# Patient Record
Sex: MALE | Race: BLACK OR AFRICAN AMERICAN | NOT HISPANIC OR LATINO | Employment: FULL TIME | URBAN - NONMETROPOLITAN AREA
[De-identification: names, ages, dates, MRNs, and addresses within clinical notes are randomized per-mention and may not be internally consistent; named-entity substitution may affect disease eponyms.]

---

## 2017-02-28 ENCOUNTER — HOSPITAL ENCOUNTER (EMERGENCY)
Facility: HOSPITAL | Age: 42
Discharge: HOME | End: 2017-02-28
Attending: EMERGENCY MEDICINE | Admitting: EMERGENCY MEDICINE

## 2017-02-28 VITALS
TEMPERATURE: 98.1 F | RESPIRATION RATE: 16 BRPM | DIASTOLIC BLOOD PRESSURE: 102 MMHG | BODY MASS INDEX: 35.07 KG/M2 | OXYGEN SATURATION: 97 % | SYSTOLIC BLOOD PRESSURE: 167 MMHG | WEIGHT: 245 LBS | HEIGHT: 70 IN | HEART RATE: 67 BPM

## 2017-02-28 DIAGNOSIS — Z20.2 POTENTIAL EXPOSURE TO STD: Primary | ICD-10-CM

## 2017-02-28 DIAGNOSIS — I10 ESSENTIAL HYPERTENSION: ICD-10-CM

## 2017-02-28 LAB
BACTERIA UR QL AUTO: ABNORMAL /HPF
BILIRUB UR QL STRIP: ABNORMAL
CLARITY UR: CLEAR
COLOR UR: ABNORMAL
GLUCOSE UR STRIP-MCNC: NEGATIVE MG/DL
HAV IGM SERPL QL IA: NEGATIVE
HBV CORE IGM SERPL QL IA: NEGATIVE
HBV SURFACE AG SERPL QL IA: NEGATIVE
HCV AB SER DONR QL: NEGATIVE
HGB UR QL STRIP.AUTO: NEGATIVE
HIV1+2 AB SER QL: NEGATIVE
HOLD SPECIMEN: NORMAL
HYALINE CASTS UR QL AUTO: ABNORMAL /LPF
KETONES UR QL STRIP: NEGATIVE
LEUKOCYTE ESTERASE UR QL STRIP.AUTO: NEGATIVE
NITRITE UR QL STRIP: NEGATIVE
PH UR STRIP.AUTO: 6.5 [PH] (ref 5–9)
PROT UR QL STRIP: ABNORMAL
RBC # UR: ABNORMAL /HPF
REF LAB TEST METHOD: ABNORMAL
SP GR UR STRIP: 1.04 (ref 1–1.03)
SQUAMOUS #/AREA URNS HPF: ABNORMAL /HPF
UROBILINOGEN UR QL STRIP: ABNORMAL
WBC UR QL AUTO: ABNORMAL /HPF
WHOLE BLOOD HOLD SPECIMEN: NORMAL
WHOLE BLOOD HOLD SPECIMEN: NORMAL

## 2017-02-28 PROCEDURE — 36415 COLL VENOUS BLD VENIPUNCTURE: CPT

## 2017-02-28 PROCEDURE — 80074 ACUTE HEPATITIS PANEL: CPT | Performed by: EMERGENCY MEDICINE

## 2017-02-28 PROCEDURE — G0432 EIA HIV-1/HIV-2 SCREEN: HCPCS | Performed by: EMERGENCY MEDICINE

## 2017-02-28 PROCEDURE — 96372 THER/PROPH/DIAG INJ SC/IM: CPT

## 2017-02-28 PROCEDURE — 86592 SYPHILIS TEST NON-TREP QUAL: CPT | Performed by: EMERGENCY MEDICINE

## 2017-02-28 PROCEDURE — 87800 DETECT AGNT MULT DNA DIREC: CPT | Performed by: EMERGENCY MEDICINE

## 2017-02-28 PROCEDURE — 25010000002 CEFTRIAXONE PER 250 MG: Performed by: EMERGENCY MEDICINE

## 2017-02-28 PROCEDURE — 99283 EMERGENCY DEPT VISIT LOW MDM: CPT

## 2017-02-28 PROCEDURE — 81001 URINALYSIS AUTO W/SCOPE: CPT | Performed by: EMERGENCY MEDICINE

## 2017-02-28 RX ORDER — CEFTRIAXONE SODIUM 250 MG/1
250 INJECTION, POWDER, FOR SOLUTION INTRAMUSCULAR; INTRAVENOUS ONCE
Status: COMPLETED | OUTPATIENT
Start: 2017-02-28 | End: 2017-02-28

## 2017-02-28 RX ORDER — LIDOCAINE HYDROCHLORIDE 10 MG/ML
0.9 INJECTION, SOLUTION EPIDURAL; INFILTRATION; INTRACAUDAL; PERINEURAL ONCE
Status: COMPLETED | OUTPATIENT
Start: 2017-02-28 | End: 2017-02-28

## 2017-02-28 RX ORDER — AZITHROMYCIN 250 MG/1
1000 TABLET, FILM COATED ORAL ONCE
Status: COMPLETED | OUTPATIENT
Start: 2017-02-28 | End: 2017-02-28

## 2017-02-28 RX ADMIN — AZITHROMYCIN 1000 MG: 250 TABLET, FILM COATED ORAL at 11:42

## 2017-02-28 RX ADMIN — LIDOCAINE HYDROCHLORIDE 0.9 ML: 10 INJECTION, SOLUTION EPIDURAL; INFILTRATION; INTRACAUDAL; PERINEURAL at 11:42

## 2017-02-28 RX ADMIN — CEFTRIAXONE SODIUM 250 MG: 250 INJECTION, POWDER, FOR SOLUTION INTRAMUSCULAR; INTRAVENOUS at 11:39

## 2017-03-01 LAB
C TRACH RRNA CVX QL NAA+PROBE: NOT DETECTED
N GONORRHOEA RRNA SPEC QL NAA+PROBE: NOT DETECTED

## 2017-03-02 LAB — RPR SER QL: NORMAL

## 2017-05-16 ENCOUNTER — HOSPITAL ENCOUNTER (EMERGENCY)
Facility: HOSPITAL | Age: 42
Discharge: HOME | End: 2017-05-16
Attending: EMERGENCY MEDICINE | Admitting: EMERGENCY MEDICINE

## 2017-05-16 ENCOUNTER — APPOINTMENT (OUTPATIENT)
Dept: GENERAL RADIOLOGY | Facility: HOSPITAL | Age: 42
End: 2017-05-16

## 2017-05-16 VITALS
RESPIRATION RATE: 18 BRPM | SYSTOLIC BLOOD PRESSURE: 117 MMHG | HEART RATE: 64 BPM | OXYGEN SATURATION: 97 % | HEIGHT: 69 IN | BODY MASS INDEX: 38.66 KG/M2 | TEMPERATURE: 98.4 F | DIASTOLIC BLOOD PRESSURE: 60 MMHG | WEIGHT: 261 LBS

## 2017-05-16 DIAGNOSIS — J18.9 PNEUMONIA OF RIGHT MIDDLE LOBE DUE TO INFECTIOUS ORGANISM: Primary | ICD-10-CM

## 2017-05-16 LAB
ALBUMIN SERPL-MCNC: 4 G/DL (ref 3.4–4.8)
ALBUMIN/GLOB SERPL: 1.1 G/DL (ref 1.1–1.8)
ALP SERPL-CCNC: 60 U/L (ref 38–126)
ALT SERPL W P-5'-P-CCNC: 49 U/L (ref 21–72)
ANION GAP SERPL CALCULATED.3IONS-SCNC: 12 MMOL/L (ref 5–15)
AST SERPL-CCNC: 47 U/L (ref 17–59)
BASOPHILS # BLD AUTO: 0.01 10*3/MM3 (ref 0–0.2)
BASOPHILS NFR BLD AUTO: 0.2 % (ref 0–2)
BILIRUB SERPL-MCNC: 0.8 MG/DL (ref 0.2–1.3)
BUN BLD-MCNC: 16 MG/DL (ref 7–21)
BUN/CREAT SERPL: 16 (ref 7–25)
CALCIUM SPEC-SCNC: 9 MG/DL (ref 8.4–10.2)
CHLORIDE SERPL-SCNC: 96 MMOL/L (ref 95–110)
CO2 SERPL-SCNC: 27 MMOL/L (ref 22–31)
CREAT BLD-MCNC: 1 MG/DL (ref 0.7–1.3)
DEPRECATED RDW RBC AUTO: 40 FL (ref 35.1–43.9)
EOSINOPHIL # BLD AUTO: 0.1 10*3/MM3 (ref 0–0.7)
EOSINOPHIL NFR BLD AUTO: 1.6 % (ref 0–7)
ERYTHROCYTE [DISTWIDTH] IN BLOOD BY AUTOMATED COUNT: 12 % (ref 11.5–14.5)
GFR SERPL CREATININE-BSD FRML MDRD: 99 ML/MIN/1.73 (ref 63–147)
GLOBULIN UR ELPH-MCNC: 3.8 GM/DL (ref 2.3–3.5)
GLUCOSE BLD-MCNC: 122 MG/DL (ref 60–100)
HCT VFR BLD AUTO: 33.3 % (ref 39–49)
HGB BLD-MCNC: 11.6 G/DL (ref 13.7–17.3)
HOLD SPECIMEN: NORMAL
IMM GRANULOCYTES # BLD: 0.03 10*3/MM3 (ref 0–0.02)
IMM GRANULOCYTES NFR BLD: 0.5 % (ref 0–0.5)
INR PPP: 1.03 (ref 0.8–1.2)
LIPASE SERPL-CCNC: 242 U/L (ref 23–300)
LYMPHOCYTES # BLD AUTO: 1.71 10*3/MM3 (ref 0.6–4.2)
LYMPHOCYTES NFR BLD AUTO: 27.5 % (ref 10–50)
MCH RBC QN AUTO: 31.7 PG (ref 26.5–34)
MCHC RBC AUTO-ENTMCNC: 34.8 G/DL (ref 31.5–36.3)
MCV RBC AUTO: 91 FL (ref 80–98)
MONOCYTES # BLD AUTO: 0.65 10*3/MM3 (ref 0–0.9)
MONOCYTES NFR BLD AUTO: 10.5 % (ref 0–12)
NEUTROPHILS # BLD AUTO: 3.71 10*3/MM3 (ref 2–8.6)
NEUTROPHILS NFR BLD AUTO: 59.7 % (ref 37–80)
NT-PROBNP SERPL-MCNC: 40.9 PG/ML (ref 0–450)
PLATELET # BLD AUTO: 276 10*3/MM3 (ref 150–450)
PMV BLD AUTO: 11.1 FL (ref 8–12)
POTASSIUM BLD-SCNC: 3.2 MMOL/L (ref 3.5–5.1)
PROT SERPL-MCNC: 7.8 G/DL (ref 6.3–8.6)
PROTHROMBIN TIME: 13.4 SECONDS (ref 11.1–15.3)
RBC # BLD AUTO: 3.66 10*6/MM3 (ref 4.37–5.74)
SODIUM BLD-SCNC: 135 MMOL/L (ref 137–145)
TROPONIN I SERPL-MCNC: <0.012 NG/ML
WBC NRBC COR # BLD: 6.21 10*3/MM3 (ref 3.2–9.8)
WHOLE BLOOD HOLD SPECIMEN: NORMAL
WHOLE BLOOD HOLD SPECIMEN: NORMAL

## 2017-05-16 PROCEDURE — 85610 PROTHROMBIN TIME: CPT | Performed by: EMERGENCY MEDICINE

## 2017-05-16 PROCEDURE — 83690 ASSAY OF LIPASE: CPT | Performed by: EMERGENCY MEDICINE

## 2017-05-16 PROCEDURE — 80053 COMPREHEN METABOLIC PANEL: CPT | Performed by: EMERGENCY MEDICINE

## 2017-05-16 PROCEDURE — 85025 COMPLETE CBC W/AUTO DIFF WBC: CPT | Performed by: EMERGENCY MEDICINE

## 2017-05-16 PROCEDURE — 71010 HC CHEST PA OR AP: CPT

## 2017-05-16 PROCEDURE — 84484 ASSAY OF TROPONIN QUANT: CPT | Performed by: EMERGENCY MEDICINE

## 2017-05-16 PROCEDURE — 25010000002 CEFTRIAXONE: Performed by: EMERGENCY MEDICINE

## 2017-05-16 PROCEDURE — 99283 EMERGENCY DEPT VISIT LOW MDM: CPT

## 2017-05-16 PROCEDURE — 83880 ASSAY OF NATRIURETIC PEPTIDE: CPT | Performed by: EMERGENCY MEDICINE

## 2017-05-16 PROCEDURE — 93005 ELECTROCARDIOGRAM TRACING: CPT | Performed by: EMERGENCY MEDICINE

## 2017-05-16 PROCEDURE — 93010 ELECTROCARDIOGRAM REPORT: CPT | Performed by: INTERNAL MEDICINE

## 2017-05-16 PROCEDURE — 96374 THER/PROPH/DIAG INJ IV PUSH: CPT

## 2017-05-16 RX ORDER — POTASSIUM CHLORIDE 750 MG/1
40 CAPSULE, EXTENDED RELEASE ORAL ONCE
Status: COMPLETED | OUTPATIENT
Start: 2017-05-16 | End: 2017-05-16

## 2017-05-16 RX ORDER — LEVOFLOXACIN 750 MG/1
750 TABLET ORAL DAILY
COMMUNITY

## 2017-05-16 RX ORDER — SODIUM CHLORIDE 0.9 % (FLUSH) 0.9 %
10 SYRINGE (ML) INJECTION AS NEEDED
Status: DISCONTINUED | OUTPATIENT
Start: 2017-05-16 | End: 2017-05-17 | Stop reason: HOSPADM

## 2017-05-16 RX ADMIN — POTASSIUM CHLORIDE 40 MEQ: 750 CAPSULE, EXTENDED RELEASE ORAL at 23:03

## 2017-05-16 RX ADMIN — CEFTRIAXONE 1 G: 1 INJECTION, POWDER, FOR SOLUTION INTRAMUSCULAR; INTRAVENOUS at 22:56

## 2017-05-16 RX ADMIN — NITROGLYCERIN 1 INCH: 20 OINTMENT TOPICAL at 21:22

## 2017-05-16 RX ADMIN — Medication 10 ML: at 22:56

## 2017-05-17 LAB — HOLD SPECIMEN: NORMAL

## 2024-06-29 ENCOUNTER — HOSPITAL ENCOUNTER (EMERGENCY)
Facility: HOSPITAL | Age: 49
Discharge: HOME | End: 2024-06-30
Attending: EMERGENCY MEDICINE | Admitting: EMERGENCY MEDICINE
Payer: MEDICAID

## 2024-06-29 ENCOUNTER — APPOINTMENT (OUTPATIENT)
Dept: GENERAL RADIOLOGY | Facility: HOSPITAL | Age: 49
End: 2024-06-29
Payer: MEDICAID

## 2024-06-29 DIAGNOSIS — R07.9 NONSPECIFIC CHEST PAIN: ICD-10-CM

## 2024-06-29 DIAGNOSIS — R07.89 CHEST WALL PAIN: Primary | ICD-10-CM

## 2024-06-29 LAB
ALBUMIN SERPL-MCNC: 4.5 G/DL (ref 3.5–5.2)
ALBUMIN/GLOB SERPL: 2 G/DL
ALP SERPL-CCNC: 97 U/L (ref 39–117)
ALT SERPL W P-5'-P-CCNC: 27 U/L (ref 1–41)
ANION GAP SERPL CALCULATED.3IONS-SCNC: 12 MMOL/L (ref 5–15)
AST SERPL-CCNC: 19 U/L (ref 1–40)
BASOPHILS # BLD AUTO: 0.02 10*3/MM3 (ref 0–0.2)
BASOPHILS NFR BLD AUTO: 0.3 % (ref 0–1.5)
BILIRUB SERPL-MCNC: 0.7 MG/DL (ref 0–1.2)
BUN SERPL-MCNC: 10 MG/DL (ref 6–20)
BUN/CREAT SERPL: 11.5 (ref 7–25)
CALCIUM SPEC-SCNC: 9.2 MG/DL (ref 8.6–10.5)
CHLORIDE SERPL-SCNC: 102 MMOL/L (ref 98–107)
CO2 SERPL-SCNC: 25 MMOL/L (ref 22–29)
CREAT SERPL-MCNC: 0.87 MG/DL (ref 0.76–1.27)
DEPRECATED RDW RBC AUTO: 39.7 FL (ref 37–54)
EGFRCR SERPLBLD CKD-EPI 2021: 105.1 ML/MIN/1.73
EOSINOPHIL # BLD AUTO: 0.11 10*3/MM3 (ref 0–0.4)
EOSINOPHIL NFR BLD AUTO: 1.6 % (ref 0.3–6.2)
ERYTHROCYTE [DISTWIDTH] IN BLOOD BY AUTOMATED COUNT: 11.7 % (ref 12.3–15.4)
GLOBULIN UR ELPH-MCNC: 2.3 GM/DL
GLUCOSE SERPL-MCNC: 219 MG/DL (ref 65–99)
HCT VFR BLD AUTO: 39.6 % (ref 37.5–51)
HGB BLD-MCNC: 13.1 G/DL (ref 13–17.7)
HOLD SPECIMEN: NORMAL
IMM GRANULOCYTES # BLD AUTO: 0.05 10*3/MM3 (ref 0–0.05)
IMM GRANULOCYTES NFR BLD AUTO: 0.7 % (ref 0–0.5)
LIPASE SERPL-CCNC: 49 U/L (ref 13–60)
LYMPHOCYTES # BLD AUTO: 2.33 10*3/MM3 (ref 0.7–3.1)
LYMPHOCYTES NFR BLD AUTO: 33.6 % (ref 19.6–45.3)
MCH RBC QN AUTO: 30.7 PG (ref 26.6–33)
MCHC RBC AUTO-ENTMCNC: 33.1 G/DL (ref 31.5–35.7)
MCV RBC AUTO: 92.7 FL (ref 79–97)
MONOCYTES # BLD AUTO: 0.48 10*3/MM3 (ref 0.1–0.9)
MONOCYTES NFR BLD AUTO: 6.9 % (ref 5–12)
NEUTROPHILS NFR BLD AUTO: 3.94 10*3/MM3 (ref 1.7–7)
NEUTROPHILS NFR BLD AUTO: 56.9 % (ref 42.7–76)
NRBC BLD AUTO-RTO: 0 /100 WBC (ref 0–0.2)
NT-PROBNP SERPL-MCNC: 61 PG/ML (ref 0–900)
PLATELET # BLD AUTO: 289 10*3/MM3 (ref 140–450)
PMV BLD AUTO: 10.4 FL (ref 6–12)
POTASSIUM SERPL-SCNC: 3.7 MMOL/L (ref 3.5–5.2)
PROT SERPL-MCNC: 6.8 G/DL (ref 6–8.5)
RBC # BLD AUTO: 4.27 10*6/MM3 (ref 4.14–5.8)
SODIUM SERPL-SCNC: 139 MMOL/L (ref 136–145)
TROPONIN T SERPL HS-MCNC: 20 NG/L
WBC NRBC COR # BLD AUTO: 6.93 10*3/MM3 (ref 3.4–10.8)
WHOLE BLOOD HOLD COAG: NORMAL
WHOLE BLOOD HOLD SPECIMEN: NORMAL

## 2024-06-29 PROCEDURE — 25010000002 ONDANSETRON PER 1 MG: Performed by: EMERGENCY MEDICINE

## 2024-06-29 PROCEDURE — 83690 ASSAY OF LIPASE: CPT

## 2024-06-29 PROCEDURE — 84484 ASSAY OF TROPONIN QUANT: CPT

## 2024-06-29 PROCEDURE — 80053 COMPREHEN METABOLIC PANEL: CPT

## 2024-06-29 PROCEDURE — 25010000002 MORPHINE PER 10 MG: Performed by: EMERGENCY MEDICINE

## 2024-06-29 PROCEDURE — 71045 X-RAY EXAM CHEST 1 VIEW: CPT

## 2024-06-29 PROCEDURE — 36415 COLL VENOUS BLD VENIPUNCTURE: CPT

## 2024-06-29 PROCEDURE — 96374 THER/PROPH/DIAG INJ IV PUSH: CPT

## 2024-06-29 PROCEDURE — 83880 ASSAY OF NATRIURETIC PEPTIDE: CPT

## 2024-06-29 PROCEDURE — 99284 EMERGENCY DEPT VISIT MOD MDM: CPT

## 2024-06-29 PROCEDURE — 96375 TX/PRO/DX INJ NEW DRUG ADDON: CPT

## 2024-06-29 PROCEDURE — 93005 ELECTROCARDIOGRAM TRACING: CPT

## 2024-06-29 PROCEDURE — 85025 COMPLETE CBC W/AUTO DIFF WBC: CPT

## 2024-06-29 RX ORDER — MORPHINE SULFATE 4 MG/ML
4 INJECTION, SOLUTION INTRAMUSCULAR; INTRAVENOUS ONCE
Status: COMPLETED | OUTPATIENT
Start: 2024-06-29 | End: 2024-06-29

## 2024-06-29 RX ORDER — ASPIRIN 81 MG/1
324 TABLET, CHEWABLE ORAL ONCE
Status: COMPLETED | OUTPATIENT
Start: 2024-06-29 | End: 2024-06-29

## 2024-06-29 RX ORDER — SODIUM CHLORIDE 0.9 % (FLUSH) 0.9 %
10 SYRINGE (ML) INJECTION AS NEEDED
Status: DISCONTINUED | OUTPATIENT
Start: 2024-06-29 | End: 2024-06-30 | Stop reason: HOSPADM

## 2024-06-29 RX ORDER — ONDANSETRON 2 MG/ML
4 INJECTION INTRAMUSCULAR; INTRAVENOUS ONCE
Status: COMPLETED | OUTPATIENT
Start: 2024-06-29 | End: 2024-06-29

## 2024-06-29 RX ADMIN — ASPIRIN 243 MG: 81 TABLET, CHEWABLE ORAL at 21:59

## 2024-06-29 RX ADMIN — MORPHINE SULFATE 4 MG: 4 INJECTION, SOLUTION INTRAMUSCULAR; INTRAVENOUS at 22:10

## 2024-06-29 RX ADMIN — ONDANSETRON 4 MG: 2 INJECTION INTRAMUSCULAR; INTRAVENOUS at 22:09

## 2024-06-30 VITALS
SYSTOLIC BLOOD PRESSURE: 137 MMHG | DIASTOLIC BLOOD PRESSURE: 80 MMHG | RESPIRATION RATE: 16 BRPM | BODY MASS INDEX: 35.25 KG/M2 | OXYGEN SATURATION: 91 % | WEIGHT: 238 LBS | HEIGHT: 69 IN | TEMPERATURE: 98.4 F | HEART RATE: 76 BPM

## 2024-06-30 LAB
GEN 5 2HR TROPONIN T REFLEX: 21 NG/L
TROPONIN T DELTA: 1 NG/L

## 2024-06-30 PROCEDURE — 84484 ASSAY OF TROPONIN QUANT: CPT

## 2024-06-30 RX ORDER — METHOCARBAMOL 750 MG/1
750 TABLET, FILM COATED ORAL 3 TIMES DAILY
Qty: 21 TABLET | Refills: 0 | Status: SHIPPED | OUTPATIENT
Start: 2024-06-30 | End: 2024-07-07

## 2024-06-30 NOTE — ED PROVIDER NOTES
Subjective   History of Present Illness  This is a 49-year-old male with past medical history of diabetes hypertension presented the emergency department some chest discomfort.  The patient is had the symptoms for last 48 hours.  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  Patient said the pain is persistent since then.  It is now radiating up into his jaw and down his arm.  Patient is concerned because he has a history of a coronary event in the past.  Patient did not take anything for the pain.  He denies any fevers or chills.  No headache or change in vision.  No focal weakness.  No shortness of breath.  No abdominal pain or vomiting.    History provided by:  Patient   used: No        Review of Systems   Constitutional:  Negative for chills and fever.   HENT:  Negative for congestion, ear pain and sore throat.    Eyes:  Negative for visual disturbance.   Respiratory:  Negative for shortness of breath.    Cardiovascular:  Positive for chest pain.   Gastrointestinal:  Negative for abdominal pain.   Genitourinary:  Negative for difficulty urinating.   Musculoskeletal:  Negative for arthralgias.   Skin:  Negative for rash.   Neurological:  Negative for dizziness, weakness and numbness.   Psychiatric/Behavioral:  Negative for agitation.        No past medical history on file.    No Known Allergies    Past Surgical History:   Procedure Laterality Date    KNEE ARTHROSCOPY         No family history on file.    Social History     Socioeconomic History    Marital status: Single   Tobacco Use    Smoking status: Some Days     Current packs/day: 0.50     Types: Cigarettes   Substance and Sexual Activity    Alcohol use: Yes     Alcohol/week: 2.0 standard drinks of alcohol     Types: 2 Shots of liquor per week    Drug use: No           Objective   Physical Exam  Vitals and nursing note reviewed.   Constitutional:       General: He is not in acute distress.      Appearance: He is not ill-appearing or toxic-appearing.   HENT:      Mouth/Throat:      Pharynx: No posterior oropharyngeal erythema.   Eyes:      Extraocular Movements: Extraocular movements intact.      Pupils: Pupils are equal, round, and reactive to light.   Cardiovascular:      Rate and Rhythm: Normal rate and regular rhythm.   Pulmonary:      Effort: Pulmonary effort is normal. No respiratory distress.   Chest:      Chest wall: Tenderness present.   Abdominal:      General: Abdomen is flat. There is no distension.      Palpations: There is no mass.      Tenderness: There is no abdominal tenderness. There is no guarding or rebound.   Musculoskeletal:         General: No deformity. Normal range of motion.   Neurological:      General: No focal deficit present.      Mental Status: He is alert.      Sensory: No sensory deficit.      Motor: No weakness.         Procedures           ED Course  ED Course as of 06/30/24 0210   Sat Jun 29, 2024 2129 BP: 152/93 [JK]   2129 Temp: 98.4 °F (36.9 °C) [JK]   2129 Temp src: Oral [JK]   2129 Heart Rate: 71 [JK]   2129 Resp: 16 [JK]   2129 SpO2: 97 %  Interpretation:  Patient's repeat vitals, telemetry tracing, and pulse oximetry tracing were directly viewed and interpreted by myself.  Normal sinus rhythm [JK]   Melissa Jun 30, 2024   0208 Comprehensive Metabolic Panel(!) [JK]   0208 CBC & Differential(!) [JK]   0208 BNP [JK]   0208 Lipase [JK]   0208 High Sensitivity Troponin T [JK]   0208 High Sensitivity Troponin T 2Hr  Interpretation:  Laboratory studies were reviewed and interpreted directly by myself.  CMP was unremarkable, CBC normal, BMP normal, lipase normal, initial troponin was 20, repeat was 21 with a delta gap of 1 [JK]   0208 XR Chest 1 View  Interpretation:  Imaging was directly visualized by myself, per my interpretations, chest x-ray was unremarkable. [JK]   0209 On reevaluation, the patient is feeling much better.  Vital signs have improved.  Overall they are  well-appearing.  There were no abnormal cardiopulmonary findings.  No respiratory distress.  Abdomen soft, nontender and no evidence of acute abdomen.  Story is minimally concerning for ACS, PE or dissection given the atypical nature, risk factors, history and physical examination.  Patient's heart score places the patient at low risk for acute coronary syndrome.  Patient feels comfortable following up with her primary care physician and/or primary cardiologist.  Patient was given strict return precautions.  Verbalized understanding. [JK]   0262 I had a discussion with the patient/family regarding diagnosis, diagnostic results, treatment plan, and medications. The patient/family indicated understanding of these instructions. I spent adequate time at the bedside prior to discharge necessary to discuss the aftercare instructions, giving patient education, providing explanations of the results of our evaluations/findings, and my decision making to assure that the patient/family understand the plan of care. Time was allotted to answer questions at that time and throughout the ED course. Patient is required to maintain timely follow up, as discussed. I also discussed the potential for the development of an acute emergent condition requiring further evaluation, return to the ER, admission, or even surgical intervention.  I encouraged the patient to return to the emergency department immediately for any concerns, worsening symptoms, new complaints, or if symptoms persist and they are unable to seek follow-up in a timely fashion. The patient/family expressed understanding and agreement with this plan    Shared decision making:   After full review of the patient's clinical presentation, review of any work-up including but not limited to laboratory studies and radiology obtained, I had a discussion with the patient.  Treatment options were discussed as well as the risks, benefits and consequences.  I discussed all findings  with the patient and family members if available.  During the discussion, treatment goals were understood by all as well as any misconceptions which were addressed with the patient.  Ample time was given for any questions they may have had.  They are in agreement with the treatment plan as well as final disposition. [JK]      ED Course User Index  [JK] Miguel Keyes MD                HEART Score: 3                              Medical Decision Making  This is a 49-year-old male with a history of diabetes, hypertension and CAD presented to the emergency department with some chest discomfort.  Seems to be atypical in nature.  Is been going on for the last 48 hours.  Patient is reproducible tenderness exam.  And concern for likely musculoskeletal pain.  However the patient does have some risk factors and history of CAD which will require workup.  Overall, the patient is nontoxic.  Afebrile.  IV access was established and the patient.  Placed on continuous telemetry monitoring.  Given the patient's presentation, differential is broad and will require further evaluation.  Workup initiated.      Differential diagnosis: CAD, ACS, peptic ulcer disease, dyspepsia, pneumonia, bronchitis, atypical chest pain, angina, musculoskeletal pain      Amount and/or Complexity of Data Reviewed  External Data Reviewed: labs, radiology, ECG and notes.     Details: External laboratories, imaging as well as notes were reviewed personally by myself.  All relevant studies were used to guide decision making.     Date of previous record: 12/27/2022    Source of note: Admission record    Summary: Patient was admitted for NSTEMI.  Reviewed based laboratory studies on file as well as previous chest x-ray and EKG.  Records reviewed    Labs: ordered. Decision-making details documented in ED Course.  Radiology: ordered and independent interpretation performed. Decision-making details documented in ED Course.  ECG/medicine tests: ordered and  independent interpretation performed. Decision-making details documented in ED Course.    Risk  Prescription drug management.        Final diagnoses:   Chest wall pain   Nonspecific chest pain       ED Disposition  ED Disposition       ED Disposition   Discharge    Condition   Stable    Comment   --               [facility]  [address]  [address]  [facility] [id number] [phone]  Call in 1 day           Medication List        New Prescriptions      methocarbamol 750 MG tablet  Commonly known as: ROBAXIN  Take 1 tablet by mouth 3 (Three) Times a Day for 7 days.               Where to Get Your Medications        These medications were sent to [facility] [id number] - [place], [place] - [facility] [address] AT [facility] [address] - [phone] PH - [phone] [address], [facility] [address]      Phone: [phone]   methocarbamol 750 MG tablet            Miguel Keyes MD  06/30/24 0218

## 2024-07-01 LAB
QT INTERVAL: 372 MS
QTC INTERVAL: 404 MS

## 2024-10-24 PROBLEM — Z95.1 S/P CABG X 5: Status: ACTIVE | Noted: 2023-01-14

## 2024-10-24 PROBLEM — F32.0 MILD MAJOR DEPRESSION: Status: ACTIVE | Noted: 2024-03-06

## 2024-10-24 PROBLEM — I21.4 NSTEMI (NON-ST ELEVATED MYOCARDIAL INFARCTION): Status: ACTIVE | Noted: 2022-10-26

## 2024-10-24 PROBLEM — Z80.0 FAMILY HISTORY OF MALIGNANT NEOPLASM OF COLON IN RELATIVE DIAGNOSED WHEN OLDER THAN 50 YEARS OF AGE: Status: ACTIVE | Noted: 2023-02-08

## 2024-10-24 PROBLEM — F17.210 CIGARETTE SMOKER: Status: ACTIVE | Noted: 2023-05-17

## 2024-10-24 PROBLEM — I25.10 ARTERIOSCLEROSIS OF CORONARY ARTERY: Status: ACTIVE | Noted: 2022-10-27

## 2024-10-24 PROBLEM — F41.9 ANXIETY: Status: ACTIVE | Noted: 2022-10-31

## 2024-10-24 PROBLEM — Z91.148 NONCOMPLIANCE WITH MEDICATION REGIMEN: Status: ACTIVE | Noted: 2022-10-26

## 2024-10-24 PROBLEM — E78.5 DYSLIPIDEMIA, GOAL LDL BELOW 70: Status: ACTIVE | Noted: 2022-11-28

## 2024-10-24 PROBLEM — E11.40 TYPE 2 DIABETES MELLITUS WITH DIABETIC NEUROPATHY, WITHOUT LONG-TERM CURRENT USE OF INSULIN: Status: ACTIVE | Noted: 2023-03-09

## 2024-10-24 PROBLEM — M17.11 PRIMARY OSTEOARTHRITIS OF RIGHT KNEE: Status: ACTIVE | Noted: 2023-05-17

## 2025-01-02 ENCOUNTER — APPOINTMENT (OUTPATIENT)
Dept: GENERAL RADIOLOGY | Facility: HOSPITAL | Age: 50
End: 2025-01-02
Payer: MEDICAID

## 2025-01-02 ENCOUNTER — HOSPITAL ENCOUNTER (EMERGENCY)
Facility: HOSPITAL | Age: 50
Discharge: HOME | End: 2025-01-03
Attending: EMERGENCY MEDICINE
Payer: MEDICAID

## 2025-01-02 VITALS
DIASTOLIC BLOOD PRESSURE: 96 MMHG | OXYGEN SATURATION: 99 % | BODY MASS INDEX: 34.8 KG/M2 | RESPIRATION RATE: 20 BRPM | SYSTOLIC BLOOD PRESSURE: 159 MMHG | HEART RATE: 66 BPM | WEIGHT: 235 LBS | HEIGHT: 69 IN | TEMPERATURE: 98.4 F

## 2025-01-02 DIAGNOSIS — R07.9 CHEST PAIN IN ADULT: ICD-10-CM

## 2025-01-02 DIAGNOSIS — J10.1 INFLUENZA A: Primary | ICD-10-CM

## 2025-01-02 DIAGNOSIS — I25.10 CORONARY ARTERY DISEASE INVOLVING NATIVE HEART, UNSPECIFIED VESSEL OR LESION TYPE, UNSPECIFIED WHETHER ANGINA PRESENT: ICD-10-CM

## 2025-01-02 LAB
ALBUMIN SERPL-MCNC: 3.9 G/DL (ref 3.5–5.2)
ALBUMIN/GLOB SERPL: 1.2 G/DL
ALP SERPL-CCNC: 108 U/L (ref 39–117)
ALT SERPL W P-5'-P-CCNC: 29 U/L (ref 1–41)
ANION GAP SERPL CALCULATED.3IONS-SCNC: 12 MMOL/L (ref 5–15)
AST SERPL-CCNC: 24 U/L (ref 1–40)
BASOPHILS # BLD AUTO: 0.02 10*3/MM3 (ref 0–0.2)
BASOPHILS NFR BLD AUTO: 0.4 % (ref 0–1.5)
BILIRUB SERPL-MCNC: 0.5 MG/DL (ref 0–1.2)
BUN SERPL-MCNC: 11 MG/DL (ref 6–20)
BUN/CREAT SERPL: 11.3 (ref 7–25)
CALCIUM SPEC-SCNC: 8.7 MG/DL (ref 8.6–10.5)
CHLORIDE SERPL-SCNC: 95 MMOL/L (ref 98–107)
CO2 SERPL-SCNC: 26 MMOL/L (ref 22–29)
CREAT SERPL-MCNC: 0.97 MG/DL (ref 0.76–1.27)
DEPRECATED RDW RBC AUTO: 38.9 FL (ref 37–54)
EGFRCR SERPLBLD CKD-EPI 2021: 95.1 ML/MIN/1.73
EOSINOPHIL # BLD AUTO: 0.08 10*3/MM3 (ref 0–0.4)
EOSINOPHIL NFR BLD AUTO: 1.6 % (ref 0.3–6.2)
ERYTHROCYTE [DISTWIDTH] IN BLOOD BY AUTOMATED COUNT: 11.7 % (ref 12.3–15.4)
FLUAV SUBTYP SPEC NAA+PROBE: DETECTED
FLUBV RNA ISLT QL NAA+PROBE: NOT DETECTED
GEN 5 1HR TROPONIN T REFLEX: 26 NG/L
GLOBULIN UR ELPH-MCNC: 3.2 GM/DL
GLUCOSE SERPL-MCNC: 287 MG/DL (ref 65–99)
HCT VFR BLD AUTO: 36.6 % (ref 37.5–51)
HGB BLD-MCNC: 12.2 G/DL (ref 13–17.7)
HOLD SPECIMEN: NORMAL
HOLD SPECIMEN: NORMAL
IMM GRANULOCYTES # BLD AUTO: 0.01 10*3/MM3 (ref 0–0.05)
IMM GRANULOCYTES NFR BLD AUTO: 0.2 % (ref 0–0.5)
LIPASE SERPL-CCNC: 75 U/L (ref 13–60)
LYMPHOCYTES # BLD AUTO: 2.04 10*3/MM3 (ref 0.7–3.1)
LYMPHOCYTES NFR BLD AUTO: 41.7 % (ref 19.6–45.3)
MCH RBC QN AUTO: 30.3 PG (ref 26.6–33)
MCHC RBC AUTO-ENTMCNC: 33.3 G/DL (ref 31.5–35.7)
MCV RBC AUTO: 90.8 FL (ref 79–97)
MONOCYTES # BLD AUTO: 0.35 10*3/MM3 (ref 0.1–0.9)
MONOCYTES NFR BLD AUTO: 7.2 % (ref 5–12)
NEUTROPHILS NFR BLD AUTO: 2.39 10*3/MM3 (ref 1.7–7)
NEUTROPHILS NFR BLD AUTO: 48.9 % (ref 42.7–76)
NRBC BLD AUTO-RTO: 0 /100 WBC (ref 0–0.2)
NT-PROBNP SERPL-MCNC: 78.2 PG/ML (ref 0–900)
PLAT MORPH BLD: NORMAL
PLATELET # BLD AUTO: 255 10*3/MM3 (ref 140–450)
PMV BLD AUTO: 10.5 FL (ref 6–12)
POTASSIUM SERPL-SCNC: 3.7 MMOL/L (ref 3.5–5.2)
PROT SERPL-MCNC: 7.1 G/DL (ref 6–8.5)
QT INTERVAL: 380 MS
QTC INTERVAL: 401 MS
RBC # BLD AUTO: 4.03 10*6/MM3 (ref 4.14–5.8)
RBC MORPH BLD: NORMAL
SARS-COV-2 RNA RESP QL NAA+PROBE: NOT DETECTED
SODIUM SERPL-SCNC: 133 MMOL/L (ref 136–145)
TROPONIN T % DELTA: -21 %
TROPONIN T NUMERIC DELTA: -7 NG/L
TROPONIN T SERPL HS-MCNC: 33 NG/L
WBC MORPH BLD: NORMAL
WBC NRBC COR # BLD AUTO: 4.89 10*3/MM3 (ref 3.4–10.8)
WHOLE BLOOD HOLD COAG: NORMAL
WHOLE BLOOD HOLD SPECIMEN: NORMAL

## 2025-01-02 PROCEDURE — 71045 X-RAY EXAM CHEST 1 VIEW: CPT

## 2025-01-02 PROCEDURE — 93005 ELECTROCARDIOGRAM TRACING: CPT

## 2025-01-02 PROCEDURE — 36415 COLL VENOUS BLD VENIPUNCTURE: CPT

## 2025-01-02 PROCEDURE — 87636 SARSCOV2 & INF A&B AMP PRB: CPT | Performed by: EMERGENCY MEDICINE

## 2025-01-02 PROCEDURE — 84484 ASSAY OF TROPONIN QUANT: CPT | Performed by: EMERGENCY MEDICINE

## 2025-01-02 PROCEDURE — 80053 COMPREHEN METABOLIC PANEL: CPT | Performed by: EMERGENCY MEDICINE

## 2025-01-02 PROCEDURE — 85007 BL SMEAR W/DIFF WBC COUNT: CPT | Performed by: EMERGENCY MEDICINE

## 2025-01-02 PROCEDURE — 85025 COMPLETE CBC W/AUTO DIFF WBC: CPT | Performed by: EMERGENCY MEDICINE

## 2025-01-02 PROCEDURE — 93005 ELECTROCARDIOGRAM TRACING: CPT | Performed by: EMERGENCY MEDICINE

## 2025-01-02 PROCEDURE — 83690 ASSAY OF LIPASE: CPT | Performed by: EMERGENCY MEDICINE

## 2025-01-02 PROCEDURE — 83880 ASSAY OF NATRIURETIC PEPTIDE: CPT | Performed by: EMERGENCY MEDICINE

## 2025-01-02 PROCEDURE — 99284 EMERGENCY DEPT VISIT MOD MDM: CPT

## 2025-01-02 RX ORDER — ASPIRIN 81 MG/1
324 TABLET, CHEWABLE ORAL ONCE
Status: COMPLETED | OUTPATIENT
Start: 2025-01-02 | End: 2025-01-02

## 2025-01-02 RX ORDER — BENZONATATE 100 MG/1
100 CAPSULE ORAL 3 TIMES DAILY PRN
Qty: 15 CAPSULE | Refills: 0 | Status: SHIPPED | OUTPATIENT
Start: 2025-01-02 | End: 2025-01-07

## 2025-01-02 RX ORDER — NITROGLYCERIN 0.4 MG/1
0.4 TABLET SUBLINGUAL
Status: DISCONTINUED | OUTPATIENT
Start: 2025-01-02 | End: 2025-01-03 | Stop reason: HOSPADM

## 2025-01-02 RX ORDER — BENZONATATE 100 MG/1
100 CAPSULE ORAL ONCE
Status: COMPLETED | OUTPATIENT
Start: 2025-01-02 | End: 2025-01-02

## 2025-01-02 RX ORDER — SODIUM CHLORIDE 0.9 % (FLUSH) 0.9 %
10 SYRINGE (ML) INJECTION AS NEEDED
Status: DISCONTINUED | OUTPATIENT
Start: 2025-01-02 | End: 2025-01-03 | Stop reason: HOSPADM

## 2025-01-02 RX ORDER — OSELTAMIVIR PHOSPHATE 75 MG/1
75 CAPSULE ORAL EVERY 12 HOURS SCHEDULED
Status: DISCONTINUED | OUTPATIENT
Start: 2025-01-02 | End: 2025-01-03 | Stop reason: HOSPADM

## 2025-01-02 RX ORDER — OSELTAMIVIR PHOSPHATE 75 MG/1
75 CAPSULE ORAL 2 TIMES DAILY
Qty: 10 CAPSULE | Refills: 0 | Status: SHIPPED | OUTPATIENT
Start: 2025-01-02 | End: 2025-01-07

## 2025-01-02 RX ADMIN — ASPIRIN 81 MG CHEWABLE TABLET 324 MG: 81 TABLET CHEWABLE at 22:50

## 2025-01-02 RX ADMIN — OSELTAMIVIR PHOSPHATE 75 MG: 75 CAPSULE ORAL at 23:26

## 2025-01-02 RX ADMIN — BENZONATATE 100 MG: 100 CAPSULE ORAL at 23:26

## 2025-01-03 LAB
QT INTERVAL: 446 MS
QTC INTERVAL: 481 MS

## 2025-01-03 NOTE — ED PROVIDER NOTES
[facility]    EMERGENCY DEPARTMENT ENCOUNTER      Pt Name: Aureliano Blake  MRN: [id number]  YOB: 1975  Date of evaluation: 1/2/2025  Provider: Sudhir An MD    CHIEF COMPLAINT       Chief Complaint   Patient presents with    Chest Pain         HISTORY OF PRESENT ILLNESS   Aureliano Blake is a 49 y.o. male who presents to the emergency department for evaluation of chest pain.  Patient states he is from a mild upper respiratory illness for about 3 or 4 days now.  Was not too worried about it until he developed left-sided chest pain starting 2 days ago that has been waxing and waning.  He does currently have pain and it has been present since midday today.  He has a little bit of difficulty breathing and a cough.  No fevers but has had episodes of diaphoresis.    Has a significant coronary disease history with coronary artery bypass    REVIEW OF SYSTEMS     ROS:  A chief complaint appropriate review of systems was completed and is negative except as noted in the HPI.      PAST MEDICAL HISTORY     Past Medical History:   Diagnosis Date    Diabetes mellitus     Heart disease     Hypertension          SURGICAL HISTORY       Past Surgical History:   Procedure Laterality Date    CARDIAC SURGERY      KNEE ARTHROSCOPY     CABG      CURRENT MEDICATIONS     No current facility-administered medications for this encounter.    Current Outpatient Medications:     amLODIPine (NORVASC) 10 MG tablet, Take 1 tablet by mouth Daily., Disp: , Rfl:     aspirin 81 MG EC tablet, Take 1 tablet by mouth Daily., Disp: , Rfl:     atorvastatin (LIPITOR) 80 MG tablet, Take 1 tablet by mouth Daily., Disp: , Rfl:     benzonatate (TESSALON) 100 MG capsule, Take 1 capsule by mouth 3 (Three) Times a Day As Needed for Cough for up to 5 days., Disp: 15 capsule, Rfl: 0    buPROPion SR (WELLBUTRIN SR) 150 MG 12 hr tablet, Take 1 tablet by mouth., Disp: , Rfl:     clopidogrel (PLAVIX) 75 MG tablet, Take 1 tablet by mouth Daily., Disp: ,  Rfl:     Diclofenac Sodium (VOLTAREN) 1 % gel gel, Apply 4 g topically to the appropriate area as directed 4 (Four) Times a Day As Needed (muscle and joint pain)., Disp: 100 g, Rfl: 0    empagliflozin (JARDIANCE) 25 MG tablet tablet, Take 1 tablet by mouth Daily., Disp: , Rfl:     ezetimibe (ZETIA) 10 MG tablet, , Disp: , Rfl:     glimepiride (AMARYL) 4 MG tablet, Take 1 tablet by mouth Daily., Disp: , Rfl:     losartan (COZAAR) 100 MG tablet, Take 1 tablet by mouth Daily., Disp: , Rfl:     metFORMIN ER (GLUCOPHAGE-XR) 500 MG 24 hr tablet, Take 2 tablets by mouth., Disp: , Rfl:     methocarbamol (ROBAXIN) 750 MG tablet, Take 1 tablet by mouth 3 (Three) Times a Day As Needed for Muscle Spasms., Disp: 30 tablet, Rfl: 0    metoprolol succinate XL (TOPROL-XL) 50 MG 24 hr tablet, Take 1 tablet by mouth Daily., Disp: , Rfl:     oseltamivir (TAMIFLU) 75 MG capsule, Take 1 capsule by mouth 2 (Two) Times a Day for 5 days., Disp: 10 capsule, Rfl: 0    Ozempic, 1 MG/DOSE, 4 MG/3ML solution pen-injector, Inject 1 mg under the skin into the appropriate area as directed., Disp: , Rfl:     ALLERGIES     Patient has no known allergies.    FAMILY HISTORY     History reviewed. No pertinent family history.       SOCIAL HISTORY       Social History     Socioeconomic History    Marital status: Single   Tobacco Use    Smoking status: Some Days     Current packs/day: 0.50     Types: Cigarettes    Smokeless tobacco: Never   Vaping Use    Vaping status: Never Used   Substance and Sexual Activity    Alcohol use: Yes     Alcohol/week: 2.0 standard drinks of alcohol     Types: 2 Shots of liquor per week    Drug use: No    Sexual activity: Defer         PHYSICAL EXAM    (up to 7 for level 4, 8 or more for level 5)     Vitals:    01/02/25 2323 01/02/25 2333 01/02/25 2338 01/02/25 2343   BP:       Pulse: 69 71 64 66   Resp:       Temp:       SpO2: 97% 97% 97% 99%   Weight:       Height:           Physical Exam  Constitutional:       General: He  is not in acute distress.  HENT:      Head: Normocephalic and atraumatic.   Eyes:      Conjunctiva/sclera: Conjunctivae normal.      Pupils: Pupils are equal, round, and reactive to light.   Cardiovascular:      Rate and Rhythm: Normal rate and regular rhythm.      Pulses: Normal pulses.      Heart sounds: No murmur heard.     No gallop.   Pulmonary:      Effort: Pulmonary effort is normal. No respiratory distress.   Abdominal:      General: Abdomen is flat. There is no distension.      Tenderness: There is no abdominal tenderness.   Musculoskeletal:         General: No swelling or deformity. Normal range of motion.      Right lower leg: No edema.      Left lower leg: No edema.   Skin:     General: Skin is warm and dry.      Capillary Refill: Capillary refill takes less than 2 seconds.   Neurological:      General: No focal deficit present.      Mental Status: He is alert and oriented to person, place, and time.   Psychiatric:         Mood and Affect: Mood normal.         Behavior: Behavior normal.            DIAGNOSTIC RESULTS     EKG: All EKGs are interpreted by the Emergency Department Physician who either signs or Co-signs this chart in the absence of a cardiologist.    ECG 12 Lead ED Triage Standing Order; Chest Pain   Preliminary Result   Test Reason : ED Triage Standing Order~   Blood Pressure :   */*   mmHG   Vent. Rate :  70 BPM     Atrial Rate :  70 BPM      P-R Int : 158 ms          QRS Dur :  96 ms       QT Int : 446 ms       P-R-T Axes :  37  24  61 degrees     QTcB Int : 481 ms      Normal sinus rhythm   Possible Left atrial enlargement   Nonspecific ST and T wave abnormality   Abnormal ECG   When compared with ECG of 02-Jan-2025 20:53,   QT has lengthened      Referred By: EDMD           Confirmed By:       ECG 12 Lead ED Triage Standing Order; Chest Pain   Final Result   Test Reason : ED Triage Standing Order~   Blood Pressure :   */*   mmHG   Vent. Rate :  67 BPM     Atrial Rate :  67 BPM      P-R  Int : 158 ms          QRS Dur : 110 ms       QT Int : 380 ms       P-R-T Axes :  35  18  42 degrees     QTcB Int : 401 ms      Normal sinus rhythm   Possible Left atrial enlargement   Nonspecific T wave abnormality   Abnormal ECG   When compared with ECG of 29-Jun-2024 21:07,   Nonspecific T wave abnormality now evident in Anterior leads   Confirmed by MD ENCARNACION KYLE ([id number]) on 1/2/2025 9:12:44 PM      Referred By: EDMD           Confirmed By: DYLAN ENCARNACION MD            RADIOLOGY:   [x] Radiologist's Report Reviewed:  XR Chest 1 View   Final Result   Impression:   Cardiomegaly.            Electronically Signed: Bandar Munoz MD     1/2/2025 9:56 PM EST     Workstation ID: [id number]          I ordered and independently reviewed the above noted radiographic studies.        LABS:  I independently interpreted all laboratory studies conducted during this ED visit.  The results of these studies can be seen below and my independent interpretation in the ED course      EMERGENCY DEPARTMENT COURSE and DIFFERENTIAL DIAGNOSIS/MDM:   Vitals:  AS OF 02:12 EST    BP - 159/96  HR - 66  TEMP - 98.4 °F (36.9 °C)  O2 SATS - 99%        Discussion below represents my analysis of pertinent findings related to patient's condition, differential diagnosis, treatment plan and final disposition.      Differential diagnosis:  The differential diagnosis associated with the patient's presentation includes: Acute coronary syndrome, pneumonia, pneumothorax, viral respiratory infection, pleurisy or pericarditis      Independent interpretations (ECG/rhythm strip/X-ray/US/CT scan): See ED course      Additional sources:  Discussed/obtained information from independent historians:   [] Spouse:   [] Parent:   [] Friend:   [] EMS:   [] Other:    External record review:  6/29/2024 reviewed most recent previous ER visit, patient evaluated for chest discomfort, had mildly elevated high-sensitivity troponin with stable 2-hour delta      Patient's care  impacted by:   [x] Diabetes   [] Hypertension   [x] Coronary Artery Disease   [] Cancer   [] Other:     Care significantly affected by Social Determinants of Health (housing and economic circumstances, unemployment)    [] Yes     [x] No   If yes, Patient's care significantly limited by  Social Determinants of Health including:    [] Inadequate housing    [] Low income    [] Alcoholism and drug addiction in family    [] Problems related to primary support group    [] Unemployment    [] Problems related to employment    [] Other Social Determinants of Health:     I considered prescription management with:    [] Pain medication:   [x] Antiviral: Tamiflu   [] Antibiotic:   [x] Other: Tessalon    Additional orders considered but not ordered:  The following testing was considered but ultimately not selected: N/A    ED Course:    ED Course as of 01/03/25 0212   Thu Jan 02, 2025 2110 Twelve-lead ECG independently interpreted by myself demonstrates normal sinus rhythm, no ST segment elevation or depression, nonspecific T wave changes in the anterolateral leads [KB]   2255 Laboratory workup independently interpreted by myself demonstrates elevated high-sensitivity troponin with stable 2-hour delta.  Hyperglycemia is present without evidence of DKA.  There is a positive influenza test [KB]   2256 Chest x-ray independently interpreted by myself demonstrates cardiomegaly without acute cardiopulmonary abnormality [KB]      ED Course User Index  [KB] Sudhir An MD         Diagnostic lab and imaging studies were conducted.  Aspirin 324 mg given.  Tessalon Perles also given and first dose of Tamiflu.    I discussed patient's lab and imaging results with him.  [facility] chest pain protocols were utilized and shared decision making was conducted.  I believe his influenza infection is a good explanation for all of his symptoms today.  He was counseled on Tamiflu use    Ultimately will plan for outpatient treatment.   Will call his cardiologist tomorrow to schedule follow-up appointment regarding chest pain and elevated troponins.    Prior to discharge from the emergency department I discussed with the patient and any family members present the current diagnosis, treatment plan, recommendations regarding follow-up with a primary care doctor or specialist, general emergency room return precautions as well as return precautions specific to their diagnosis and treatment.  The patient/family indicated understanding of these instructions.  Time was allotted to answer questions at that time and throughout the ED course.         PROCEDURES:  Procedures    CRITICAL CARE TIME        CONSULTS       FINAL IMPRESSION      1. Influenza A    2. Chest pain in adult    3. Coronary artery disease involving native heart, unspecified vessel or lesion type, unspecified whether angina present          DISPOSITION/PLAN     ED Disposition       ED Disposition   Discharge    Condition   Stable    Comment   --                 Comment: Please note this report has been produced using speech recognition software.      Sudhir An MD  Attending Emergency Physician    Recent Results (from the past 24 hours)   ECG 12 Lead ED Triage Standing Order; Chest Pain    Collection Time: 01/02/25  8:53 PM   Result Value Ref Range    QT Interval 380 ms    QTC Interval 401 ms   High Sensitivity Troponin T    Collection Time: 01/02/25  9:05 PM    Specimen: Blood   Result Value Ref Range    HS Troponin T 33 (H) <22 ng/L   Comprehensive Metabolic Panel    Collection Time: 01/02/25  9:05 PM    Specimen: Blood   Result Value Ref Range    Glucose 287 (H) 65 - 99 mg/dL    BUN 11 6 - 20 mg/dL    Creatinine 0.97 0.76 - 1.27 mg/dL    Sodium 133 (L) 136 - 145 mmol/L    Potassium 3.7 3.5 - 5.2 mmol/L    Chloride 95 (L) 98 - 107 mmol/L    CO2 26.0 22.0 - 29.0 mmol/L    Calcium 8.7 8.6 - 10.5 mg/dL    Total Protein 7.1 6.0 - 8.5 g/dL    Albumin 3.9 3.5 - 5.2 g/dL    ALT (SGPT) 29 1  - 41 U/L    AST (SGOT) 24 1 - 40 U/L    Alkaline Phosphatase 108 39 - 117 U/L    Total Bilirubin 0.5 0.0 - 1.2 mg/dL    Globulin 3.2 gm/dL    A/G Ratio 1.2 g/dL    BUN/Creatinine Ratio 11.3 7.0 - 25.0    Anion Gap 12.0 5.0 - 15.0 mmol/L    eGFR 95.1 >60.0 mL/min/1.73   Lipase    Collection Time: 01/02/25  9:05 PM    Specimen: Blood   Result Value Ref Range    Lipase 75 (H) 13 - 60 U/L   BNP    Collection Time: 01/02/25  9:05 PM    Specimen: Blood   Result Value Ref Range    proBNP 78.2 0.0 - 900.0 pg/mL   Green Top (Gel)    Collection Time: 01/02/25  9:05 PM   Result Value Ref Range    Extra Tube Hold for add-ons.    Lavender Top    Collection Time: 01/02/25  9:05 PM   Result Value Ref Range    Extra Tube hold for add-on    Gold Top - SST    Collection Time: 01/02/25  9:05 PM   Result Value Ref Range    Extra Tube Hold for add-ons.    Light Blue Top    Collection Time: 01/02/25  9:05 PM   Result Value Ref Range    Extra Tube Hold for add-ons.    CBC Auto Differential    Collection Time: 01/02/25  9:05 PM    Specimen: Blood   Result Value Ref Range    WBC 4.89 3.40 - 10.80 10*3/mm3    RBC 4.03 (L) 4.14 - 5.80 10*6/mm3    Hemoglobin 12.2 (L) 13.0 - 17.7 g/dL    Hematocrit 36.6 (L) 37.5 - 51.0 %    MCV 90.8 79.0 - 97.0 fL    MCH 30.3 26.6 - 33.0 pg    MCHC 33.3 31.5 - 35.7 g/dL    RDW 11.7 (L) 12.3 - 15.4 %    RDW-SD 38.9 37.0 - 54.0 fl    MPV 10.5 6.0 - 12.0 fL    Platelets 255 140 - 450 10*3/mm3    Neutrophil % 48.9 42.7 - 76.0 %    Lymphocyte % 41.7 19.6 - 45.3 %    Monocyte % 7.2 5.0 - 12.0 %    Eosinophil % 1.6 0.3 - 6.2 %    Basophil % 0.4 0.0 - 1.5 %    Immature Grans % 0.2 0.0 - 0.5 %    Neutrophils, Absolute 2.39 1.70 - 7.00 10*3/mm3    Lymphocytes, Absolute 2.04 0.70 - 3.10 10*3/mm3    Monocytes, Absolute 0.35 0.10 - 0.90 10*3/mm3    Eosinophils, Absolute 0.08 0.00 - 0.40 10*3/mm3    Basophils, Absolute 0.02 0.00 - 0.20 10*3/mm3    Immature Grans, Absolute 0.01 0.00 - 0.05 10*3/mm3    nRBC 0.0 0.0 - 0.2 /100  WBC   Scan Slide    Collection Time: 01/02/25  9:05 PM    Specimen: Blood   Result Value Ref Range    RBC Morphology Normal Normal    WBC Morphology Normal Normal    Platelet Morphology Normal Normal   High Sensitivity Troponin T 1Hr    Collection Time: 01/02/25 10:21 PM    Specimen: Blood   Result Value Ref Range    HS Troponin T 26 (H) <22 ng/L    Troponin T Numeric Delta -7 ng/L    Troponin T % Delta -21 Abnormal if >/= 20% %   COVID-19 and FLU A/B PCR, 1 HR TAT - Swab, Nasopharynx    Collection Time: 01/02/25 10:24 PM    Specimen: Nasopharynx; Swab   Result Value Ref Range    COVID19 Not Detected Not Detected - Ref. Range    Influenza A PCR Detected (A) Not Detected    Influenza B PCR Not Detected Not Detected   ECG 12 Lead ED Triage Standing Order; Chest Pain    Collection Time: 01/02/25 10:31 PM   Result Value Ref Range    QT Interval 446 ms    QTC Interval 481 ms     Note: In addition to lab results from this visit, the labs listed above may include labs taken at another facility or during a different encounter within the last 24 hours. Please correlate lab times with ED admission and discharge times for further clarification of the services performed during this visit.                 Sudhir An MD  01/03/25 0212

## 2025-01-03 NOTE — ED NOTES
" Aureliano Blake    Nursing Report ED to Floor:  Mental status: A&ox4  Ambulatory status: independent   Oxygen Therapy:  RA  Cardiac Rhythm: NSR  Admitted from: home  Safety Concerns:  fall risk/ droplet precautions  Social Issues: none  ED Room #:  29    ED Nurse Phone Extension - [phone] or may call [phone].      HPI:   Chief Complaint   Patient presents with    Chest Pain       Past Medical History:  Past Medical History:   Diagnosis Date    Diabetes mellitus     Heart disease     Hypertension         Past Surgical History:  Past Surgical History:   Procedure Laterality Date    CARDIAC SURGERY      KNEE ARTHROSCOPY          Admitting Doctor:   No admitting provider for patient encounter.    Consulting Provider(s):  Consults       No orders found from 12/4/2024 to 1/3/2025.             Admitting Diagnosis:   The primary encounter diagnosis was Influenza A. Diagnoses of Chest pain in adult and Coronary artery disease involving native heart, unspecified vessel or lesion type, unspecified whether angina present were also pertinent to this visit.    Most Recent Vitals:   Vitals:    01/02/25 2047 01/02/25 2241 01/02/25 2246   BP: 174/94 159/96    Pulse: 69 72 78   Resp: 20     Temp: 98.4 °F (36.9 °C)     SpO2: 97% 98% 95%   Weight: 107 kg (235 lb)     Height: 175.3 cm (69\")         Active LDAs/IV Access:   Lines, Drains & Airways       Active LDAs       Name Placement date Placement time Site Days    Peripheral IV 01/02/25 2221 Left Antecubital 01/02/25 2221  Antecubital  less than 1                    Labs (abnormal labs have a star):   Labs Reviewed   COVID-19 AND FLU A/B, NP SWAB IN TRANSPORT MEDIA 1 HR TAT - Abnormal; Notable for the following components:       Result Value    Influenza A PCR Detected (*)     All other components within normal limits    Narrative:     Fact sheet for providers: https://www.fda.gov/media/754551/download    Fact sheet for patients: https://www.fda.gov/media/984544/download    Test " performed by PCR.   TROPONIN - Abnormal; Notable for the following components:    HS Troponin T 33 (*)     All other components within normal limits    Narrative:     High Sensitive Troponin T Reference Range:  <14.0 ng/L- Negative Female for AMI  <22.0 ng/L- Negative Male for AMI  >=14 - Abnormal Female indicating possible myocardial injury.  >=22 - Abnormal Male indicating possible myocardial injury.   Clinicians would have to utilize clinical acumen, EKG, Troponin, and serial changes to determine if it is an Acute Myocardial Infarction or myocardial injury due to an underlying chronic condition.        COMPREHENSIVE METABOLIC PANEL - Abnormal; Notable for the following components:    Glucose 287 (*)     Sodium 133 (*)     Chloride 95 (*)     All other components within normal limits    Narrative:     GFR Categories in Chronic Kidney Disease (CKD)      GFR Category          GFR (mL/min/1.73)    Interpretation  G1                     90 or greater         Normal or high (1)  G2                      60-89                Mild decrease (1)  G3a                   45-59                Mild to moderate decrease  G3b                   30-44                Moderate to severe decrease  G4                    15-29                Severe decrease  G5                    14 or less           Kidney failure          (1)In the absence of evidence of kidney disease, neither GFR category G1 or G2 fulfill the criteria for CKD.    eGFR calculation 2021 CKD-EPI creatinine equation, which does not include race as a factor   LIPASE - Abnormal; Notable for the following components:    Lipase 75 (*)     All other components within normal limits   CBC WITH AUTO DIFFERENTIAL - Abnormal; Notable for the following components:    RBC 4.03 (*)     Hemoglobin 12.2 (*)     Hematocrit 36.6 (*)     RDW 11.7 (*)     All other components within normal limits    Narrative:     Appended report. These results have been appended to a previously verified  report.   HIGH SENSITIVITIY TROPONIN T 1HR - Abnormal; Notable for the following components:    HS Troponin T 26 (*)     All other components within normal limits    Narrative:     High Sensitive Troponin T Reference Range:  <14.0 ng/L- Negative Female for AMI  <22.0 ng/L- Negative Male for AMI  >=14 - Abnormal Female indicating possible myocardial injury.  >=22 - Abnormal Male indicating possible myocardial injury.   Clinicians would have to utilize clinical acumen, EKG, Troponin, and serial changes to determine if it is an Acute Myocardial Infarction or myocardial injury due to an underlying chronic condition.        BNP (IN-HOUSE) - Normal    Narrative:     This assay is used as an aid in the diagnosis of individuals suspected of having heart failure. It can be used as an aid in the diagnosis of acute decompensated heart failure (ADHF) in patients presenting with signs and symptoms of ADHF to the emergency department (ED). In addition, NT-proBNP of <300 pg/mL indicates ADHF is not likely.    Age Range Result Interpretation  NT-proBNP Concentration (pg/mL:      <50             Positive            >450                   Gray                 300-450                    Negative             <300    50-75           Positive            >900                  Gray                300-900                  Negative            <300      >75             Positive            >1800                  Gray                300-1800                  Negative            <300   SCAN SLIDE - Normal   COVID PRE-OP / PRE-PROCEDURE SCREENING ORDER (NO ISOLATION)    Narrative:     The following orders were created for panel order COVID PRE-OP / PRE-PROCEDURE SCREENING ORDER (NO ISOLATION) - Swab, Nasopharynx.  Procedure                               Abnormality         Status                     ---------                               -----------         ------                     COVID-19 and FLU A/B PCR...[[id number]]  Abnormal             Final result                 Please view results for these tests on the individual orders.   RAINBOW DRAW    Narrative:     The following orders were created for panel order [place] Draw.  Procedure                               Abnormality         Status                     ---------                               -----------         ------                     Green Top (Gel)[[id number]]                                  Final result               Lavender Top[[id number]]                                     Final result               Gold Top - SST[[id number]]                                   Final result               Gray Top[[id number]]                                                                    Light Blue Top[[id number]]                                   Final result                 Please view results for these tests on the individual orders.   CBC AND DIFFERENTIAL    Narrative:     The following orders were created for panel order CBC & Differential.  Procedure                               Abnormality         Status                     ---------                               -----------         ------                     CBC Auto Differential[[id number]]        Abnormal            Final result               Scan Slide[[id number]]                   Normal              Final result                 Please view results for these tests on the individual orders.   GREEN TOP   LAVENDER TOP   GOLD TOP - SST   LIGHT BLUE TOP   GRAY TOP       Meds Given in ED:   Medications   sodium chloride 0.9 % flush 10 mL (has no administration in time range)   nitroglycerin (NITROSTAT) SL tablet 0.4 mg (has no administration in time range)   oseltamivir (TAMIFLU) capsule 75 mg (has no administration in time range)   benzonatate (TESSALON) capsule 100 mg (has no administration in time range)   aspirin chewable tablet 324 mg (324 mg Oral Given 1/2/25 1790)           Last NIH score:                                                           Dysphagia screening results:  Patient Factors Component (Dysphagia:Stroke or Rule-out)  Best Eye Response: 4-->(E4) spontaneous (01/02/25 2314)  Best Motor Response: 6-->(M6) obeys commands (01/02/25 2314)  Best Verbal Response: 5-->(V5) oriented (01/02/25 2314)  Adria Coma Scale Score: 15 (01/02/25 2314)     [place] Coma Scale:  No data recorded     CIWA:        Restraint Type:            Isolation Status:  Droplet

## 2025-01-03 NOTE — DISCHARGE INSTRUCTIONS
Prescribed Tessalon for treatment of cough.  Take Tamiflu for treatment of influenza infection.  Drink plenty of fluids to stay well-hydrated.  Call your cardiologist tomorrow morning to schedule close follow-up appointment.  Return to the ER as needed for any new or worsening symptoms

## 2025-01-15 LAB
QT INTERVAL: 446 MS
QTC INTERVAL: 481 MS

## 2025-02-07 ENCOUNTER — HOSPITAL ENCOUNTER (OUTPATIENT)
Facility: HOSPITAL | Age: 50
Setting detail: OBSERVATION
Discharge: HOME | End: 2025-02-09
Attending: EMERGENCY MEDICINE | Admitting: INTERNAL MEDICINE
Payer: MEDICAID

## 2025-02-07 ENCOUNTER — APPOINTMENT (OUTPATIENT)
Dept: GENERAL RADIOLOGY | Facility: HOSPITAL | Age: 50
End: 2025-02-07
Payer: MEDICAID

## 2025-02-07 DIAGNOSIS — I10 POORLY-CONTROLLED HYPERTENSION: ICD-10-CM

## 2025-02-07 DIAGNOSIS — E87.6 HYPOKALEMIA: ICD-10-CM

## 2025-02-07 DIAGNOSIS — R07.2 PRECORDIAL CHEST PAIN: Primary | ICD-10-CM

## 2025-02-07 DIAGNOSIS — Z86.79 HISTORY OF CORONARY ARTERY DISEASE: ICD-10-CM

## 2025-02-07 LAB
ALBUMIN SERPL-MCNC: 4.5 G/DL (ref 3.5–5.2)
ALBUMIN/GLOB SERPL: 1.7 G/DL
ALP SERPL-CCNC: 89 U/L (ref 39–117)
ALT SERPL W P-5'-P-CCNC: 22 U/L (ref 1–41)
ANION GAP SERPL CALCULATED.3IONS-SCNC: 14 MMOL/L (ref 5–15)
AST SERPL-CCNC: 21 U/L (ref 1–40)
BASOPHILS # BLD AUTO: 0.03 10*3/MM3 (ref 0–0.2)
BASOPHILS NFR BLD AUTO: 0.4 % (ref 0–1.5)
BILIRUB SERPL-MCNC: 0.6 MG/DL (ref 0–1.2)
BUN SERPL-MCNC: 6 MG/DL (ref 6–20)
BUN/CREAT SERPL: 6 (ref 7–25)
CALCIUM SPEC-SCNC: 9.5 MG/DL (ref 8.6–10.5)
CHLORIDE SERPL-SCNC: 103 MMOL/L (ref 98–107)
CO2 SERPL-SCNC: 26 MMOL/L (ref 22–29)
CREAT SERPL-MCNC: 1 MG/DL (ref 0.76–1.27)
DEPRECATED RDW RBC AUTO: 43.7 FL (ref 37–54)
EGFRCR SERPLBLD CKD-EPI 2021: 91.7 ML/MIN/1.73
EOSINOPHIL # BLD AUTO: 0.18 10*3/MM3 (ref 0–0.4)
EOSINOPHIL NFR BLD AUTO: 2.5 % (ref 0.3–6.2)
ERYTHROCYTE [DISTWIDTH] IN BLOOD BY AUTOMATED COUNT: 12.6 % (ref 12.3–15.4)
GLOBULIN UR ELPH-MCNC: 2.7 GM/DL
GLUCOSE SERPL-MCNC: 264 MG/DL (ref 65–99)
HCT VFR BLD AUTO: 36 % (ref 37.5–51)
HGB BLD-MCNC: 11.4 G/DL (ref 13–17.7)
HOLD SPECIMEN: NORMAL
IMM GRANULOCYTES # BLD AUTO: 0.05 10*3/MM3 (ref 0–0.05)
IMM GRANULOCYTES NFR BLD AUTO: 0.7 % (ref 0–0.5)
LIPASE SERPL-CCNC: 46 U/L (ref 13–60)
LYMPHOCYTES # BLD AUTO: 2.42 10*3/MM3 (ref 0.7–3.1)
LYMPHOCYTES NFR BLD AUTO: 33.2 % (ref 19.6–45.3)
MCH RBC QN AUTO: 30.3 PG (ref 26.6–33)
MCHC RBC AUTO-ENTMCNC: 31.7 G/DL (ref 31.5–35.7)
MCV RBC AUTO: 95.7 FL (ref 79–97)
MONOCYTES # BLD AUTO: 0.45 10*3/MM3 (ref 0.1–0.9)
MONOCYTES NFR BLD AUTO: 6.2 % (ref 5–12)
NEUTROPHILS NFR BLD AUTO: 4.16 10*3/MM3 (ref 1.7–7)
NEUTROPHILS NFR BLD AUTO: 57 % (ref 42.7–76)
NRBC BLD AUTO-RTO: 0 /100 WBC (ref 0–0.2)
NT-PROBNP SERPL-MCNC: 632.2 PG/ML (ref 0–900)
PLATELET # BLD AUTO: 351 10*3/MM3 (ref 140–450)
PMV BLD AUTO: 10.2 FL (ref 6–12)
POTASSIUM SERPL-SCNC: 3.4 MMOL/L (ref 3.5–5.2)
PROT SERPL-MCNC: 7.2 G/DL (ref 6–8.5)
RBC # BLD AUTO: 3.76 10*6/MM3 (ref 4.14–5.8)
SODIUM SERPL-SCNC: 143 MMOL/L (ref 136–145)
TROPONIN T SERPL HS-MCNC: 27 NG/L
WBC NRBC COR # BLD AUTO: 7.29 10*3/MM3 (ref 3.4–10.8)
WHOLE BLOOD HOLD COAG: NORMAL
WHOLE BLOOD HOLD SPECIMEN: NORMAL

## 2025-02-07 PROCEDURE — 80053 COMPREHEN METABOLIC PANEL: CPT | Performed by: EMERGENCY MEDICINE

## 2025-02-07 PROCEDURE — 86140 C-REACTIVE PROTEIN: CPT | Performed by: NURSE PRACTITIONER

## 2025-02-07 PROCEDURE — 36415 COLL VENOUS BLD VENIPUNCTURE: CPT

## 2025-02-07 PROCEDURE — 84484 ASSAY OF TROPONIN QUANT: CPT | Performed by: EMERGENCY MEDICINE

## 2025-02-07 PROCEDURE — 84443 ASSAY THYROID STIM HORMONE: CPT | Performed by: NURSE PRACTITIONER

## 2025-02-07 PROCEDURE — 99285 EMERGENCY DEPT VISIT HI MDM: CPT

## 2025-02-07 PROCEDURE — 85652 RBC SED RATE AUTOMATED: CPT | Performed by: NURSE PRACTITIONER

## 2025-02-07 PROCEDURE — 93005 ELECTROCARDIOGRAM TRACING: CPT

## 2025-02-07 PROCEDURE — 93005 ELECTROCARDIOGRAM TRACING: CPT | Performed by: EMERGENCY MEDICINE

## 2025-02-07 PROCEDURE — 71045 X-RAY EXAM CHEST 1 VIEW: CPT

## 2025-02-07 PROCEDURE — 96374 THER/PROPH/DIAG INJ IV PUSH: CPT

## 2025-02-07 PROCEDURE — G0378 HOSPITAL OBSERVATION PER HR: HCPCS

## 2025-02-07 PROCEDURE — 83735 ASSAY OF MAGNESIUM: CPT | Performed by: NURSE PRACTITIONER

## 2025-02-07 PROCEDURE — 83880 ASSAY OF NATRIURETIC PEPTIDE: CPT | Performed by: EMERGENCY MEDICINE

## 2025-02-07 PROCEDURE — 83036 HEMOGLOBIN GLYCOSYLATED A1C: CPT | Performed by: NURSE PRACTITIONER

## 2025-02-07 PROCEDURE — 85025 COMPLETE CBC W/AUTO DIFF WBC: CPT | Performed by: EMERGENCY MEDICINE

## 2025-02-07 PROCEDURE — 96375 TX/PRO/DX INJ NEW DRUG ADDON: CPT

## 2025-02-07 PROCEDURE — 83690 ASSAY OF LIPASE: CPT | Performed by: EMERGENCY MEDICINE

## 2025-02-07 RX ORDER — ASPIRIN 81 MG/1
324 TABLET, CHEWABLE ORAL ONCE
Status: COMPLETED | OUTPATIENT
Start: 2025-02-07 | End: 2025-02-08

## 2025-02-07 RX ORDER — HYDROCODONE BITARTRATE AND ACETAMINOPHEN 5; 325 MG/1; MG/1
1 TABLET ORAL ONCE
Status: COMPLETED | OUTPATIENT
Start: 2025-02-07 | End: 2025-02-08

## 2025-02-07 RX ORDER — SODIUM CHLORIDE 0.9 % (FLUSH) 0.9 %
10 SYRINGE (ML) INJECTION AS NEEDED
Status: DISCONTINUED | OUTPATIENT
Start: 2025-02-07 | End: 2025-02-09 | Stop reason: HOSPADM

## 2025-02-07 RX ORDER — POTASSIUM CHLORIDE 750 MG/1
40 CAPSULE, EXTENDED RELEASE ORAL ONCE
Status: COMPLETED | OUTPATIENT
Start: 2025-02-07 | End: 2025-02-08

## 2025-02-07 RX ORDER — CLONIDINE HYDROCHLORIDE 0.1 MG/1
0.1 TABLET ORAL ONCE
Status: COMPLETED | OUTPATIENT
Start: 2025-02-07 | End: 2025-02-08

## 2025-02-07 RX ORDER — ONDANSETRON 4 MG/1
4 TABLET, ORALLY DISINTEGRATING ORAL ONCE
Status: COMPLETED | OUTPATIENT
Start: 2025-02-07 | End: 2025-02-08

## 2025-02-08 ENCOUNTER — APPOINTMENT (OUTPATIENT)
Dept: CT IMAGING | Facility: HOSPITAL | Age: 50
End: 2025-02-08
Payer: MEDICAID

## 2025-02-08 ENCOUNTER — APPOINTMENT (OUTPATIENT)
Dept: CARDIOLOGY | Facility: HOSPITAL | Age: 50
End: 2025-02-08
Payer: MEDICAID

## 2025-02-08 PROBLEM — E87.6 HYPOKALEMIA: Status: ACTIVE | Noted: 2025-02-08

## 2025-02-08 PROBLEM — R07.9 CHEST PAIN: Status: ACTIVE | Noted: 2025-02-08

## 2025-02-08 PROBLEM — E83.42 HYPOMAGNESEMIA: Status: ACTIVE | Noted: 2025-02-08

## 2025-02-08 LAB
ANION GAP SERPL CALCULATED.3IONS-SCNC: 11 MMOL/L (ref 5–15)
AV MEAN PRESS GRAD SYS DOP V1V2: 2 MMHG
AV VMAX SYS DOP: 110 CM/SEC
BASOPHILS # BLD AUTO: 0.03 10*3/MM3 (ref 0–0.2)
BASOPHILS NFR BLD AUTO: 0.5 % (ref 0–1.5)
BH CV ECHO MEAS - AO MAX PG: 4.8 MMHG
BH CV ECHO MEAS - AO ROOT DIAM: 2.7 CM
BH CV ECHO MEAS - AO V2 VTI: 23.4 CM
BH CV ECHO MEAS - AVA(I,D): 2.44 CM2
BH CV ECHO MEAS - EDV(CUBED): 140.6 ML
BH CV ECHO MEAS - EDV(MOD-SP2): 85.3 ML
BH CV ECHO MEAS - EDV(MOD-SP4): 133 ML
BH CV ECHO MEAS - EF(MOD-SP2): 43.5 %
BH CV ECHO MEAS - EF(MOD-SP4): 50.9 %
BH CV ECHO MEAS - ESV(CUBED): 39.3 ML
BH CV ECHO MEAS - ESV(MOD-SP2): 48.2 ML
BH CV ECHO MEAS - ESV(MOD-SP4): 65.3 ML
BH CV ECHO MEAS - FS: 34.6 %
BH CV ECHO MEAS - IVS/LVPW: 1 CM
BH CV ECHO MEAS - IVSD: 1.2 CM
BH CV ECHO MEAS - LA DIMENSION: 4.1 CM
BH CV ECHO MEAS - LAT PEAK E' VEL: 7.7 CM/SEC
BH CV ECHO MEAS - LV MASS(C)D: 248.8 GRAMS
BH CV ECHO MEAS - LV MAX PG: 2.8 MMHG
BH CV ECHO MEAS - LV MEAN PG: 1 MMHG
BH CV ECHO MEAS - LV V1 MAX: 83.8 CM/SEC
BH CV ECHO MEAS - LV V1 VTI: 18.2 CM
BH CV ECHO MEAS - LVIDD: 5.2 CM
BH CV ECHO MEAS - LVIDS: 3.4 CM
BH CV ECHO MEAS - LVOT AREA: 3.1 CM2
BH CV ECHO MEAS - LVOT DIAM: 2 CM
BH CV ECHO MEAS - LVPWD: 1.2 CM
BH CV ECHO MEAS - MED PEAK E' VEL: 7.6 CM/SEC
BH CV ECHO MEAS - MV A MAX VEL: 62.9 CM/SEC
BH CV ECHO MEAS - MV DEC SLOPE: 142 CM/SEC2
BH CV ECHO MEAS - MV DEC TIME: 0.2 SEC
BH CV ECHO MEAS - MV E MAX VEL: 80.2 CM/SEC
BH CV ECHO MEAS - MV E/A: 1.28
BH CV ECHO MEAS - MV MAX PG: 2.7 MMHG
BH CV ECHO MEAS - MV MEAN PG: 1 MMHG
BH CV ECHO MEAS - MV P1/2T: 173.5 MSEC
BH CV ECHO MEAS - MV V2 VTI: 37.6 CM
BH CV ECHO MEAS - MVA(P1/2T): 1.27 CM2
BH CV ECHO MEAS - MVA(VTI): 1.52 CM2
BH CV ECHO MEAS - PA ACC TIME: 0.11 SEC
BH CV ECHO MEAS - RAP SYSTOLE: 3 MMHG
BH CV ECHO MEAS - RVSP: 17.6 MMHG
BH CV ECHO MEAS - SV(LVOT): 57.2 ML
BH CV ECHO MEAS - SV(MOD-SP2): 37.1 ML
BH CV ECHO MEAS - SV(MOD-SP4): 67.7 ML
BH CV ECHO MEAS - TAPSE (>1.6): 3.2 CM
BH CV ECHO MEAS - TR MAX PG: 14.6 MMHG
BH CV ECHO MEAS - TR MAX VEL: 191 CM/SEC
BH CV ECHO MEASUREMENTS AVERAGE E/E' RATIO: 10.48
BH CV XLRA - RV BASE: 4.3 CM
BH CV XLRA - RV LENGTH: 8 CM
BH CV XLRA - RV MID: 4.1 CM
BH CV XLRA - TDI S': 9.8 CM/SEC
BUN SERPL-MCNC: 8 MG/DL (ref 6–20)
BUN/CREAT SERPL: 8.6 (ref 7–25)
CALCIUM SPEC-SCNC: 8.7 MG/DL (ref 8.6–10.5)
CHLORIDE SERPL-SCNC: 105 MMOL/L (ref 98–107)
CHOLEST SERPL-MCNC: 87 MG/DL (ref 0–200)
CO2 SERPL-SCNC: 25 MMOL/L (ref 22–29)
CREAT SERPL-MCNC: 0.93 MG/DL (ref 0.76–1.27)
CRP SERPL-MCNC: 0.3 MG/DL (ref 0–0.5)
DEPRECATED RDW RBC AUTO: 45.2 FL (ref 37–54)
EGFRCR SERPLBLD CKD-EPI 2021: 100 ML/MIN/1.73
EOSINOPHIL # BLD AUTO: 0.15 10*3/MM3 (ref 0–0.4)
EOSINOPHIL NFR BLD AUTO: 2.5 % (ref 0.3–6.2)
ERYTHROCYTE [DISTWIDTH] IN BLOOD BY AUTOMATED COUNT: 12.8 % (ref 12.3–15.4)
ERYTHROCYTE [SEDIMENTATION RATE] IN BLOOD: 34 MM/HR (ref 0–20)
GEN 5 1HR TROPONIN T REFLEX: 26 NG/L
GLUCOSE BLDC GLUCOMTR-MCNC: 145 MG/DL (ref 70–130)
GLUCOSE BLDC GLUCOMTR-MCNC: 188 MG/DL (ref 70–130)
GLUCOSE BLDC GLUCOMTR-MCNC: 292 MG/DL (ref 70–130)
GLUCOSE SERPL-MCNC: 181 MG/DL (ref 65–99)
HBA1C MFR BLD: 8.1 % (ref 4.8–5.6)
HCT VFR BLD AUTO: 32.4 % (ref 37.5–51)
HDLC SERPL-MCNC: 26 MG/DL (ref 40–60)
HGB BLD-MCNC: 10.2 G/DL (ref 13–17.7)
IMM GRANULOCYTES # BLD AUTO: 0.02 10*3/MM3 (ref 0–0.05)
IMM GRANULOCYTES NFR BLD AUTO: 0.3 % (ref 0–0.5)
LDLC SERPL CALC-MCNC: 38 MG/DL (ref 0–100)
LDLC/HDLC SERPL: 1.35 {RATIO}
LEFT ATRIUM VOLUME INDEX: 22.2 ML/M2
LV EF BIPLANE MOD: 46.9 %
LYMPHOCYTES # BLD AUTO: 2.31 10*3/MM3 (ref 0.7–3.1)
LYMPHOCYTES NFR BLD AUTO: 39 % (ref 19.6–45.3)
MAGNESIUM SERPL-MCNC: 1.6 MG/DL (ref 1.6–2.6)
MCH RBC QN AUTO: 30.4 PG (ref 26.6–33)
MCHC RBC AUTO-ENTMCNC: 31.5 G/DL (ref 31.5–35.7)
MCV RBC AUTO: 96.7 FL (ref 79–97)
MONOCYTES # BLD AUTO: 0.45 10*3/MM3 (ref 0.1–0.9)
MONOCYTES NFR BLD AUTO: 7.6 % (ref 5–12)
NEUTROPHILS NFR BLD AUTO: 2.96 10*3/MM3 (ref 1.7–7)
NEUTROPHILS NFR BLD AUTO: 50.1 % (ref 42.7–76)
NRBC BLD AUTO-RTO: 0 /100 WBC (ref 0–0.2)
PA ADP PRP-ACNC: 176 PRU
PLATELET # BLD AUTO: 321 10*3/MM3 (ref 140–450)
PMV BLD AUTO: 10.4 FL (ref 6–12)
POTASSIUM SERPL-SCNC: 3.7 MMOL/L (ref 3.5–5.2)
POTASSIUM SERPL-SCNC: 3.9 MMOL/L (ref 3.5–5.2)
RBC # BLD AUTO: 3.35 10*6/MM3 (ref 4.14–5.8)
SODIUM SERPL-SCNC: 141 MMOL/L (ref 136–145)
TRIGL SERPL-MCNC: 129 MG/DL (ref 0–150)
TROPONIN T % DELTA: -4
TROPONIN T NUMERIC DELTA: -1 NG/L
TROPONIN T SERPL HS-MCNC: 28 NG/L
TROPONIN T SERPL HS-MCNC: 33 NG/L
TSH SERPL DL<=0.05 MIU/L-ACNC: 2.09 UIU/ML (ref 0.27–4.2)
VLDLC SERPL-MCNC: 23 MG/DL (ref 5–40)
WBC NRBC COR # BLD AUTO: 5.92 10*3/MM3 (ref 3.4–10.8)

## 2025-02-08 PROCEDURE — 25010000002 HYDRALAZINE PER 20 MG: Performed by: EMERGENCY MEDICINE

## 2025-02-08 PROCEDURE — 85576 BLOOD PLATELET AGGREGATION: CPT | Performed by: NURSE PRACTITIONER

## 2025-02-08 PROCEDURE — 25010000002 KETOROLAC TROMETHAMINE PER 15 MG: Performed by: NURSE PRACTITIONER

## 2025-02-08 PROCEDURE — 80061 LIPID PANEL: CPT | Performed by: NURSE PRACTITIONER

## 2025-02-08 PROCEDURE — G0378 HOSPITAL OBSERVATION PER HR: HCPCS

## 2025-02-08 PROCEDURE — 84484 ASSAY OF TROPONIN QUANT: CPT | Performed by: NURSE PRACTITIONER

## 2025-02-08 PROCEDURE — 99243 OFF/OP CNSLTJ NEW/EST LOW 30: CPT | Performed by: INTERNAL MEDICINE

## 2025-02-08 PROCEDURE — 84132 ASSAY OF SERUM POTASSIUM: CPT | Performed by: STUDENT IN AN ORGANIZED HEALTH CARE EDUCATION/TRAINING PROGRAM

## 2025-02-08 PROCEDURE — 93306 TTE W/DOPPLER COMPLETE: CPT

## 2025-02-08 PROCEDURE — 25010000002 MAGNESIUM SULFATE 4 GM/100ML SOLUTION: Performed by: NURSE PRACTITIONER

## 2025-02-08 PROCEDURE — 99222 1ST HOSP IP/OBS MODERATE 55: CPT | Performed by: NURSE PRACTITIONER

## 2025-02-08 PROCEDURE — 85025 COMPLETE CBC W/AUTO DIFF WBC: CPT | Performed by: NURSE PRACTITIONER

## 2025-02-08 PROCEDURE — 96374 THER/PROPH/DIAG INJ IV PUSH: CPT

## 2025-02-08 PROCEDURE — 25010000002 MORPHINE PER 10 MG: Performed by: EMERGENCY MEDICINE

## 2025-02-08 PROCEDURE — 96375 TX/PRO/DX INJ NEW DRUG ADDON: CPT

## 2025-02-08 PROCEDURE — 82948 REAGENT STRIP/BLOOD GLUCOSE: CPT

## 2025-02-08 PROCEDURE — 93005 ELECTROCARDIOGRAM TRACING: CPT | Performed by: NURSE PRACTITIONER

## 2025-02-08 PROCEDURE — 63710000001 INSULIN REGULAR HUMAN PER 5 UNITS: Performed by: NURSE PRACTITIONER

## 2025-02-08 PROCEDURE — 71275 CT ANGIOGRAPHY CHEST: CPT

## 2025-02-08 PROCEDURE — 63710000001 ONDANSETRON ODT 4 MG TABLET DISPERSIBLE: Performed by: EMERGENCY MEDICINE

## 2025-02-08 PROCEDURE — 25510000001 IOPAMIDOL PER 1 ML: Performed by: EMERGENCY MEDICINE

## 2025-02-08 PROCEDURE — 93306 TTE W/DOPPLER COMPLETE: CPT | Performed by: INTERNAL MEDICINE

## 2025-02-08 PROCEDURE — 80048 BASIC METABOLIC PNL TOTAL CA: CPT | Performed by: NURSE PRACTITIONER

## 2025-02-08 PROCEDURE — 74174 CTA ABD&PLVS W/CONTRAST: CPT

## 2025-02-08 RX ORDER — ONDANSETRON 2 MG/ML
4 INJECTION INTRAMUSCULAR; INTRAVENOUS EVERY 6 HOURS PRN
Status: DISCONTINUED | OUTPATIENT
Start: 2025-02-08 | End: 2025-02-09 | Stop reason: HOSPADM

## 2025-02-08 RX ORDER — HYDROMORPHONE HYDROCHLORIDE 1 MG/ML
0.5 INJECTION, SOLUTION INTRAMUSCULAR; INTRAVENOUS; SUBCUTANEOUS
Status: DISCONTINUED | OUTPATIENT
Start: 2025-02-08 | End: 2025-02-09

## 2025-02-08 RX ORDER — METOPROLOL SUCCINATE 50 MG/1
50 TABLET, EXTENDED RELEASE ORAL DAILY
Status: DISCONTINUED | OUTPATIENT
Start: 2025-02-08 | End: 2025-02-09 | Stop reason: HOSPADM

## 2025-02-08 RX ORDER — SODIUM CHLORIDE 0.9 % (FLUSH) 0.9 %
10 SYRINGE (ML) INJECTION EVERY 12 HOURS SCHEDULED
Status: DISCONTINUED | OUTPATIENT
Start: 2025-02-08 | End: 2025-02-09 | Stop reason: HOSPADM

## 2025-02-08 RX ORDER — KETOROLAC TROMETHAMINE 30 MG/ML
30 INJECTION, SOLUTION INTRAMUSCULAR; INTRAVENOUS ONCE
Status: COMPLETED | OUTPATIENT
Start: 2025-02-08 | End: 2025-02-08

## 2025-02-08 RX ORDER — BISACODYL 10 MG
10 SUPPOSITORY, RECTAL RECTAL DAILY PRN
Status: DISCONTINUED | OUTPATIENT
Start: 2025-02-08 | End: 2025-02-09 | Stop reason: HOSPADM

## 2025-02-08 RX ORDER — DEXTROSE MONOHYDRATE 25 G/50ML
25 INJECTION, SOLUTION INTRAVENOUS
Status: DISCONTINUED | OUTPATIENT
Start: 2025-02-08 | End: 2025-02-09

## 2025-02-08 RX ORDER — LOSARTAN POTASSIUM 50 MG/1
100 TABLET ORAL NIGHTLY
Status: DISCONTINUED | OUTPATIENT
Start: 2025-02-08 | End: 2025-02-09 | Stop reason: HOSPADM

## 2025-02-08 RX ORDER — HYDRALAZINE HYDROCHLORIDE 20 MG/ML
10 INJECTION INTRAMUSCULAR; INTRAVENOUS ONCE
Status: COMPLETED | OUTPATIENT
Start: 2025-02-08 | End: 2025-02-08

## 2025-02-08 RX ORDER — ASPIRIN 81 MG/1
81 TABLET ORAL DAILY
Status: DISCONTINUED | OUTPATIENT
Start: 2025-02-09 | End: 2025-02-09 | Stop reason: HOSPADM

## 2025-02-08 RX ORDER — ACETAMINOPHEN 650 MG/1
650 SUPPOSITORY RECTAL EVERY 4 HOURS PRN
Status: DISCONTINUED | OUTPATIENT
Start: 2025-02-08 | End: 2025-02-09 | Stop reason: HOSPADM

## 2025-02-08 RX ORDER — BISACODYL 5 MG/1
5 TABLET, DELAYED RELEASE ORAL DAILY PRN
Status: DISCONTINUED | OUTPATIENT
Start: 2025-02-08 | End: 2025-02-09 | Stop reason: HOSPADM

## 2025-02-08 RX ORDER — POLYETHYLENE GLYCOL 3350 17 G/17G
17 POWDER, FOR SOLUTION ORAL DAILY PRN
Status: DISCONTINUED | OUTPATIENT
Start: 2025-02-08 | End: 2025-02-09 | Stop reason: HOSPADM

## 2025-02-08 RX ORDER — AMOXICILLIN 250 MG
2 CAPSULE ORAL 2 TIMES DAILY
Status: DISCONTINUED | OUTPATIENT
Start: 2025-02-08 | End: 2025-02-09 | Stop reason: HOSPADM

## 2025-02-08 RX ORDER — POTASSIUM CHLORIDE 1500 MG/1
20 TABLET, EXTENDED RELEASE ORAL ONCE
Status: COMPLETED | OUTPATIENT
Start: 2025-02-08 | End: 2025-02-08

## 2025-02-08 RX ORDER — IBUPROFEN 600 MG/1
1 TABLET ORAL
Status: DISCONTINUED | OUTPATIENT
Start: 2025-02-08 | End: 2025-02-09

## 2025-02-08 RX ORDER — ONDANSETRON 4 MG/1
4 TABLET, ORALLY DISINTEGRATING ORAL EVERY 6 HOURS PRN
Status: DISCONTINUED | OUTPATIENT
Start: 2025-02-08 | End: 2025-02-09 | Stop reason: HOSPADM

## 2025-02-08 RX ORDER — SODIUM CHLORIDE 0.9 % (FLUSH) 0.9 %
10 SYRINGE (ML) INJECTION AS NEEDED
Status: DISCONTINUED | OUTPATIENT
Start: 2025-02-08 | End: 2025-02-09 | Stop reason: HOSPADM

## 2025-02-08 RX ORDER — IOPAMIDOL 755 MG/ML
85 INJECTION, SOLUTION INTRAVASCULAR
Status: COMPLETED | OUTPATIENT
Start: 2025-02-08 | End: 2025-02-08

## 2025-02-08 RX ORDER — ACETAMINOPHEN 160 MG/5ML
650 SOLUTION ORAL EVERY 4 HOURS PRN
Status: DISCONTINUED | OUTPATIENT
Start: 2025-02-08 | End: 2025-02-09 | Stop reason: HOSPADM

## 2025-02-08 RX ORDER — SODIUM CHLORIDE 9 MG/ML
40 INJECTION, SOLUTION INTRAVENOUS AS NEEDED
Status: DISCONTINUED | OUTPATIENT
Start: 2025-02-08 | End: 2025-02-09 | Stop reason: HOSPADM

## 2025-02-08 RX ORDER — ACETAMINOPHEN 325 MG/1
650 TABLET ORAL EVERY 4 HOURS PRN
Status: DISCONTINUED | OUTPATIENT
Start: 2025-02-08 | End: 2025-02-09 | Stop reason: HOSPADM

## 2025-02-08 RX ORDER — NALOXONE HCL 0.4 MG/ML
0.4 VIAL (ML) INJECTION
Status: DISCONTINUED | OUTPATIENT
Start: 2025-02-08 | End: 2025-02-09

## 2025-02-08 RX ORDER — FAMOTIDINE 20 MG/1
40 TABLET, FILM COATED ORAL DAILY
Status: DISCONTINUED | OUTPATIENT
Start: 2025-02-08 | End: 2025-02-09 | Stop reason: HOSPADM

## 2025-02-08 RX ORDER — ATORVASTATIN CALCIUM 40 MG/1
80 TABLET, FILM COATED ORAL DAILY
Status: DISCONTINUED | OUTPATIENT
Start: 2025-02-08 | End: 2025-02-09 | Stop reason: HOSPADM

## 2025-02-08 RX ORDER — MORPHINE SULFATE 2 MG/ML
2 INJECTION, SOLUTION INTRAMUSCULAR; INTRAVENOUS ONCE
Status: COMPLETED | OUTPATIENT
Start: 2025-02-08 | End: 2025-02-08

## 2025-02-08 RX ORDER — MAGNESIUM SULFATE HEPTAHYDRATE 40 MG/ML
4 INJECTION, SOLUTION INTRAVENOUS ONCE
Status: COMPLETED | OUTPATIENT
Start: 2025-02-08 | End: 2025-02-08

## 2025-02-08 RX ORDER — SPIRONOLACTONE 25 MG/1
25 TABLET ORAL DAILY
Status: DISCONTINUED | OUTPATIENT
Start: 2025-02-08 | End: 2025-02-09 | Stop reason: HOSPADM

## 2025-02-08 RX ORDER — NICOTINE POLACRILEX 4 MG
15 LOZENGE BUCCAL
Status: DISCONTINUED | OUTPATIENT
Start: 2025-02-08 | End: 2025-02-09

## 2025-02-08 RX ORDER — BUPROPION HYDROCHLORIDE 150 MG/1
150 TABLET, EXTENDED RELEASE ORAL EVERY 12 HOURS SCHEDULED
Status: DISCONTINUED | OUTPATIENT
Start: 2025-02-08 | End: 2025-02-09 | Stop reason: HOSPADM

## 2025-02-08 RX ORDER — AMLODIPINE BESYLATE 10 MG/1
10 TABLET ORAL DAILY
Status: DISCONTINUED | OUTPATIENT
Start: 2025-02-08 | End: 2025-02-09 | Stop reason: HOSPADM

## 2025-02-08 RX ORDER — CLOPIDOGREL BISULFATE 75 MG/1
75 TABLET ORAL DAILY
Status: DISCONTINUED | OUTPATIENT
Start: 2025-02-08 | End: 2025-02-09 | Stop reason: HOSPADM

## 2025-02-08 RX ADMIN — INSULIN HUMAN 6 UNITS: 100 INJECTION, SOLUTION PARENTERAL at 18:40

## 2025-02-08 RX ADMIN — EMPAGLIFLOZIN 10 MG: 10 TABLET, FILM COATED ORAL at 18:16

## 2025-02-08 RX ADMIN — AMLODIPINE BESYLATE 10 MG: 10 TABLET ORAL at 09:22

## 2025-02-08 RX ADMIN — CLOPIDOGREL BISULFATE 75 MG: 75 TABLET ORAL at 09:22

## 2025-02-08 RX ADMIN — ASPIRIN 243 MG: 81 TABLET, CHEWABLE ORAL at 00:02

## 2025-02-08 RX ADMIN — BUPROPION HYDROCHLORIDE 150 MG: 150 TABLET, FILM COATED, EXTENDED RELEASE ORAL at 09:23

## 2025-02-08 RX ADMIN — POTASSIUM CHLORIDE 20 MEQ: 1500 TABLET, EXTENDED RELEASE ORAL at 12:22

## 2025-02-08 RX ADMIN — SPIRONOLACTONE 25 MG: 25 TABLET ORAL at 18:16

## 2025-02-08 RX ADMIN — MAGNESIUM SULFATE IN WATER FOR 4 G: 40 INJECTION INTRAVENOUS at 05:33

## 2025-02-08 RX ADMIN — MORPHINE SULFATE 2 MG: 2 INJECTION, SOLUTION INTRAMUSCULAR; INTRAVENOUS at 01:03

## 2025-02-08 RX ADMIN — INSULIN HUMAN 2 UNITS: 100 INJECTION, SOLUTION PARENTERAL at 05:44

## 2025-02-08 RX ADMIN — KETOROLAC TROMETHAMINE 30 MG: 30 INJECTION, SOLUTION INTRAMUSCULAR; INTRAVENOUS at 05:33

## 2025-02-08 RX ADMIN — Medication 5 MG: at 20:02

## 2025-02-08 RX ADMIN — LOSARTAN POTASSIUM 100 MG: 50 TABLET, FILM COATED ORAL at 20:02

## 2025-02-08 RX ADMIN — Medication 10 ML: at 20:01

## 2025-02-08 RX ADMIN — POTASSIUM CHLORIDE 40 MEQ: 750 CAPSULE, EXTENDED RELEASE ORAL at 00:07

## 2025-02-08 RX ADMIN — FAMOTIDINE 40 MG: 20 TABLET, FILM COATED ORAL at 09:23

## 2025-02-08 RX ADMIN — ATORVASTATIN CALCIUM 80 MG: 40 TABLET, FILM COATED ORAL at 09:23

## 2025-02-08 RX ADMIN — BUPROPION HYDROCHLORIDE 150 MG: 150 TABLET, FILM COATED, EXTENDED RELEASE ORAL at 20:01

## 2025-02-08 RX ADMIN — HYDRALAZINE HYDROCHLORIDE 10 MG: 20 INJECTION INTRAMUSCULAR; INTRAVENOUS at 00:54

## 2025-02-08 RX ADMIN — HYDROCODONE BITARTRATE AND ACETAMINOPHEN 1 TABLET: 5; 325 TABLET ORAL at 00:06

## 2025-02-08 RX ADMIN — METOPROLOL SUCCINATE 50 MG: 50 TABLET, EXTENDED RELEASE ORAL at 09:23

## 2025-02-08 RX ADMIN — CLONIDINE HYDROCHLORIDE 0.1 MG: 0.1 TABLET ORAL at 00:04

## 2025-02-08 RX ADMIN — NITROGLYCERIN 1 INCH: 20 OINTMENT TOPICAL at 12:23

## 2025-02-08 RX ADMIN — NITROGLYCERIN 1 INCH: 20 OINTMENT TOPICAL at 18:16

## 2025-02-08 RX ADMIN — ONDANSETRON 4 MG: 4 TABLET, ORALLY DISINTEGRATING ORAL at 00:06

## 2025-02-08 RX ADMIN — SENNOSIDES AND DOCUSATE SODIUM 2 TABLET: 50; 8.6 TABLET ORAL at 09:23

## 2025-02-08 RX ADMIN — IOPAMIDOL 85 ML: 755 INJECTION, SOLUTION INTRAVENOUS at 01:18

## 2025-02-08 RX ADMIN — Medication 10 ML: at 09:28

## 2025-02-08 NOTE — PROGRESS NOTES
[facility]  ADMISSION FOLLOW-UP NOTE          Patient admitted after midnight, H&P by my partner performed earlier on today's date reviewed.  Interim findings, labs, and charting also reviewed.        The [facility] Hospital Problem List has been managed and updated to include any new diagnoses:  Active Hospital Problems    Diagnosis  POA    **Chest pain [R07.9]  Yes    Hypokalemia [E87.6]  Unknown    Hypomagnesemia [E83.42]  Unknown    Type 2 diabetes mellitus with diabetic neuropathy, without long-term current use of insulin [E11.40]  Yes    S/P CABG x 5 [Z95.1]  Not Applicable    Dyslipidemia, goal LDL below 70 [E78.5]  Yes    Arteriosclerosis of coronary artery [I25.10]  Yes      Resolved Hospital Problems   No resolved problems to display.         ADDITIONAL PLAN:  - detailed assessment and plan from admission reviewed  - patient seen and examined    This patient's problems and plans were partially entered by my partner and updated as appropriate by me 02/08/25.    Chest pain w/ palpitations  CAD s/p CABG x 5  - continued pain despite norco and morphine, L chest wall NOT reproducible on exam this AM  - chest xray w/ cardiomegaly, no acute findings  - CTA chest/abd/pelvis w/ no PE/dissection; no acute process  - ESR 34, CRP 0.3  - repeat troponin since continued pain, initial trop 27 w/ repeat 26  - cardiology consulted  - ECHO pending  - continue DAPT, P2Y12  176     HTN / HLD  - BP elevated on arrival, 201/100  - s/p clonidine 0.1 mg and hydralazine 10 mg in ED  - continue amlodipine, metoprolol, losartan     Hypokalemia  Hypomagnesemia  - replace electrolytes     T2DM - non insulin dependent, uncontrolled  - glucose 264  - fsbg Q 6 hours while NPO w/ moderate dose ssi  - hold glimiperide, metformin  - A1C 8.1%  - SSI     Expected Discharge   Expected Discharge Date: 2/9/2025; Expected Discharge Time:      Elidia Mueller MD  02/08/25

## 2025-02-08 NOTE — ED NOTES
Auerliano Blake    Nursing Report ED to Floor:  Mental status: GCS 15  Ambulatory status: IND  Oxygen Therapy:  RA  Cardiac Rhythm: SINUS  Admitted from: HOME  Safety Concerns:  N/A  Precautions: STANDARD   Social Issues: N/A  ED Room #:  08    ED Nurse Phone Extension - [phone] or may call [phone].      HPI:   Chief Complaint   Patient presents with    Chest Pain       Past Medical History:  Past Medical History:   Diagnosis Date    Diabetes mellitus     Heart disease     Hypertension         Past Surgical History:  Past Surgical History:   Procedure Laterality Date    CARDIAC SURGERY      KNEE ARTHROSCOPY          Admitting Doctor:   No admitting provider for patient encounter.    Consulting Provider(s):  Consults       No orders found for last 30 day(s).             Admitting Diagnosis:   The primary encounter diagnosis was Precordial chest pain. Diagnoses of Poorly-controlled hypertension, History of coronary artery disease, and Hypokalemia were also pertinent to this visit.    Most Recent Vitals:   Vitals:    02/08/25 0000 02/08/25 0004 02/08/25 0005 02/08/25 0054   BP: (!) 199/113 (!) 199/113  176/100   BP Location:       Patient Position:       Pulse: 61 62 64 62   Resp:       Temp:       TempSrc:       SpO2: 99% 97% 98%    Weight:       Height:           Active LDAs/IV Access:   Lines, Drains & Airways       Active LDAs       Name Placement date Placement time Site Days    Peripheral IV 02/07/25 2125 Left Antecubital 02/07/25 2125  Antecubital  less than 1                    Labs (abnormal labs have a star):   Labs Reviewed   TROPONIN - Abnormal; Notable for the following components:       Result Value    HS Troponin T 27 (*)     All other components within normal limits    Narrative:     High Sensitive Troponin T Reference Range:  <14.0 ng/L- Negative Female for AMI  <22.0 ng/L- Negative Male for AMI  >=14 - Abnormal Female indicating possible myocardial injury.  >=22 - Abnormal Male indicating possible  myocardial injury.   Clinicians would have to utilize clinical acumen, EKG, Troponin, and serial changes to determine if it is an Acute Myocardial Infarction or myocardial injury due to an underlying chronic condition.        COMPREHENSIVE METABOLIC PANEL - Abnormal; Notable for the following components:    Glucose 264 (*)     Potassium 3.4 (*)     BUN/Creatinine Ratio 6.0 (*)     All other components within normal limits    Narrative:     GFR Categories in Chronic Kidney Disease (CKD)      GFR Category          GFR (mL/min/1.73)    Interpretation  G1                     90 or greater         Normal or high (1)  G2                      60-89                Mild decrease (1)  G3a                   45-59                Mild to moderate decrease  G3b                   30-44                Moderate to severe decrease  G4                    15-29                Severe decrease  G5                    14 or less           Kidney failure          (1)In the absence of evidence of kidney disease, neither GFR category G1 or G2 fulfill the criteria for CKD.    eGFR calculation 2021 CKD-EPI creatinine equation, which does not include race as a factor   CBC WITH AUTO DIFFERENTIAL - Abnormal; Notable for the following components:    RBC 3.76 (*)     Hemoglobin 11.4 (*)     Hematocrit 36.0 (*)     Immature Grans % 0.7 (*)     All other components within normal limits   HIGH SENSITIVITIY TROPONIN T 1HR - Abnormal; Notable for the following components:    HS Troponin T 26 (*)     All other components within normal limits    Narrative:     High Sensitive Troponin T Reference Range:  <14.0 ng/L- Negative Female for AMI  <22.0 ng/L- Negative Male for AMI  >=14 - Abnormal Female indicating possible myocardial injury.  >=22 - Abnormal Male indicating possible myocardial injury.   Clinicians would have to utilize clinical acumen, EKG, Troponin, and serial changes to determine if it is an Acute Myocardial Infarction or myocardial injury due  to an underlying chronic condition.        LIPASE - Normal   BNP (IN-HOUSE) - Normal    Narrative:     This assay is used as an aid in the diagnosis of individuals suspected of having heart failure. It can be used as an aid in the diagnosis of acute decompensated heart failure (ADHF) in patients presenting with signs and symptoms of ADHF to the emergency department (ED). In addition, NT-proBNP of <300 pg/mL indicates ADHF is not likely.    Age Range Result Interpretation  NT-proBNP Concentration (pg/mL:      <50             Positive            >450                   Gray                 300-450                    Negative             <300    50-75           Positive            >900                  Gray                300-900                  Negative            <300      >75             Positive            >1800                  Gray                300-1800                  Negative            <300   RAINBOW DRAW    Narrative:     The following orders were created for panel order [place] Draw.  Procedure                               Abnormality         Status                     ---------                               -----------         ------                     Green Top (Gel)[[id number]]                                  Final result               Lavender Top[[id number]]                                     Final result               Gold Top - SST[[id number]]                                   Final result               [place] Top[[id number]]                                         Final result               Light Blue Top[[id number]]                                   Final result                 Please view results for these tests on the individual orders.   CBC AND DIFFERENTIAL    Narrative:     The following orders were created for panel order CBC & Differential.  Procedure                               Abnormality         Status                     ---------                               -----------         ------                      CBC Auto Differential[[id number]]        Abnormal            Final result                 Please view results for these tests on the individual orders.   GREEN TOP   LAVENDER TOP   GOLD TOP - SST   GRAY TOP   LIGHT BLUE TOP       Meds Given in ED:   Medications   sodium chloride 0.9 % flush 10 mL (has no administration in time range)   aspirin chewable tablet 324 mg (243 mg Oral Given 2/8/25 0002)   cloNIDine (CATAPRES) tablet 0.1 mg (0.1 mg Oral Given 2/8/25 0004)   HYDROcodone-acetaminophen (NORCO) 5-325 MG per tablet 1 tablet (1 tablet Oral Given 2/8/25 0006)   ondansetron ODT (ZOFRAN-ODT) disintegrating tablet 4 mg (4 mg Oral Given 2/8/25 0006)   potassium chloride (MICRO-K/KLOR-CON) CR capsule (40 mEq Oral Given 2/8/25 0007)   hydrALAZINE (APRESOLINE) injection 10 mg (10 mg Intravenous Given 2/8/25 0054)   morphine injection 2 mg (2 mg Intravenous Given 2/8/25 0103)   iopamidol (ISOVUE-370) 76 % injection 85 mL (85 mL Intravenous Given 2/8/25 0118)           Last NIH score:                                                          Dysphagia screening results:        [place] Coma Scale:  No data recorded     CIWA:        Restraint Type:            Isolation Status:  No active isolations

## 2025-02-08 NOTE — H&P
[facility] Medicine Services  HISTORY AND PHYSICAL    Patient Name: Aureliano Blake  : 1975  MRN: [id number]  Primary Care Physician: Aliya Lee NP  Date of admission: 2025    Subjective   Subjective     Chief Complaint:  Chest pain    HPI:  Aureliano Blake is a 49 y.o. male with PMHx of RLE DVT, CAD s/p CABG x 5, NSTEMI / ACS (2022 [facility] w/ occluded sequential vein graft to OM1/OM2, high grade disease in saphenous vein graft to small diagonal #2, patent LIMA to LAD and patent vein graft RCA; tx w/ lovenox x 48 hours, concern for medical noncompliance w/ DAPT), HTN, HLD, T2DM who presents to the ED for evaluation of chest pain. Reports acute onset L sided chest pain that radiated in the L neck and down L arm w/ numbness and tingling in his fingers; was not doing any strenuous activity at the time of onset. He has had intermittent palpitations today as well as feeling dizzy / light headed but denies syncope. Reports this chest pain is similar to prior episodes of chest pain. His pain has improved slightly but he is still having significant pain. BP is elevated on arrival, reports this is high for him, typically better controlled. Reports no recent medication changes, has been compliant with prescribed meds. His cardiologist is in [place], he is relocating to [place]. He did have the flu last month but was not hospitalized. He will be admitted to the hospital medicine service for further management.    Review of Systems   Respiratory:  Positive for shortness of breath. Negative for cough and wheezing.    Cardiovascular:  Positive for chest pain and palpitations.   Gastrointestinal:  Positive for nausea.   Neurological:  Positive for dizziness and light-headedness. Negative for syncope.   All other systems reviewed and are negative.         Personal History     Past Medical History:   Diagnosis Date    Diabetes mellitus     Heart disease     Hypertension               Past Surgical History:   Procedure Laterality Date    CARDIAC SURGERY      KNEE ARTHROSCOPY         Family History:   family history is not on file.     Social History:  reports that he has been smoking cigarettes. He has never used smokeless tobacco. He reports current alcohol use of about 2.0 standard drinks of alcohol per week. He reports that he does not use drugs.  Social History     Social History Narrative    Not on file       Medications:  Diclofenac Sodium, Semaglutide (2 MG/DOSE), amLODIPine, aspirin, atorvastatin, buPROPion SR, clopidogrel, empagliflozin, ezetimibe, glimepiride, losartan, metFORMIN ER, methocarbamol, and metoprolol succinate XL    No Known Allergies    Objective   Objective     Vital Signs:   Temp:  [98 °F (36.7 °C)] 98 °F (36.7 °C)  Heart Rate:  [61-66] 62  Resp:  [19] 19  BP: (176-201)/(100-113) 176/100    Physical Exam  Vitals reviewed.   Constitutional:       General: He is not in acute distress.     Appearance: He is well-developed. He is obese. He is not toxic-appearing.   HENT:      Head: Normocephalic and atraumatic.      Nose: Nose normal.      Mouth/Throat:      Pharynx: Oropharynx is clear.   Eyes:      Extraocular Movements: Extraocular movements intact.      Conjunctiva/sclera: Conjunctivae normal.      Pupils: Pupils are equal, round, and reactive to light.   Cardiovascular:      Rate and Rhythm: Normal rate and regular rhythm.      Pulses: Normal pulses.      Heart sounds: Normal heart sounds. No murmur heard.  Pulmonary:      Effort: Pulmonary effort is normal. No respiratory distress.      Breath sounds: Normal breath sounds.   Abdominal:      General: Abdomen is protuberant. Bowel sounds are normal. There is no distension.      Palpations: Abdomen is soft.      Tenderness: There is no abdominal tenderness.   Musculoskeletal:         General: Tenderness (L chest wall tenderness) present. No swelling. Normal range of motion.      Cervical back: Normal range of  motion and neck supple. No rigidity or tenderness.   Skin:     General: Skin is warm and dry.      Capillary Refill: Capillary refill takes less than 2 seconds.      Findings: No rash.   Neurological:      Mental Status: He is alert and oriented to person, place, and time.      Cranial Nerves: No cranial nerve deficit.   Psychiatric:         Mood and Affect: Mood normal.         Behavior: Behavior normal.             Result Review:  I have personally reviewed the results from the time of this admission to 2/8/2025 03:24 EST and agree with these findings:  [x]  Laboratory list / accordion  []  Microbiology  [x]  Radiology  [x]  EKG/Telemetry   []  Cardiology/Vascular   []  Pathology  []  Old records  []  Other:  Most notable findings include:      LAB RESULTS:      Lab 02/07/25 2327 02/07/25 2127   WBC  --  7.29   HEMOGLOBIN  --  11.4*   HEMATOCRIT  --  36.0*   PLATELETS  --  351   NEUTROS ABS  --  4.16   IMMATURE GRANS (ABS)  --  0.05   LYMPHS ABS  --  2.42   MONOS ABS  --  0.45   EOS ABS  --  0.18   MCV  --  95.7   SED RATE  --  34*   CRP 0.30  --          Lab 02/07/25 2327 02/07/25 2127   SODIUM  --  143   POTASSIUM  --  3.4*   CHLORIDE  --  103   CO2  --  26.0   ANION GAP  --  14.0   BUN  --  6   CREATININE  --  1.00   EGFR  --  91.7   GLUCOSE  --  264*   CALCIUM  --  9.5   MAGNESIUM 1.6  --    TSH 2.090  --          Lab 02/07/25 2127   TOTAL PROTEIN 7.2   ALBUMIN 4.5   GLOBULIN 2.7   ALT (SGPT) 22   AST (SGOT) 21   BILIRUBIN 0.6   ALK PHOS 89   LIPASE 46         Lab 02/07/25 2327 02/07/25 2127   PROBNP  --  632.2   HSTROP T 26* 27*                 Brief Urine Lab Results       None          Microbiology Results (last 10 days)       ** No results found for the last 240 hours. **            CT Angiogram Chest    Result Date: 2/8/2025  CT ANGIOGRAM CHEST, CT ANGIOGRAM ABDOMEN PELVIS Date of Exam: 2/8/2025 12:58 AM EST Indication: rule out aortic dissection. Chest pain abdominal pain Comparison: 2/7/2025.  Technique: CTA of the chest, abdomen and pelvis was performed after the uneventful intravenous administration of intravenous contrast. Reconstructed coronal and sagittal images were also obtained. In addition, a 3-D volume rendered image was created for interpretation. Automated exposure control and iterative reconstruction methods were used. Findings: CT Chest: The thyroid appears within normal limits. The trachea and the esophagus are unremarkable. The heart appears enlarged..  No mediastinal or hilar lymphadenopathy.  No significant pericardial effusion.  The aorta and great vessels appear within normal limits.  Pulmonary artery is unremarkable. No evidence of dissection. Atherosclerotic calcifications are present within the coronary arteries. No evidence of pulmonary embolism. There is no pneumothorax, pleural effusion or focal airspace consolidation. There are no suspicious lung nodules.  No significant pleural disease.  Central and segmental airways appear patent. Subcutaneous fat and underlying musculature appear within normal limits.  There are no acute osseous abnormalities or destructive bone lesions. CT Abdomen and Pelvis: Aorta appears unremarkable. No evidence of dissection. Mild atherosclerotic calcifications present. The origins of the mesenteric vasculature and the renal vasculature appears patent. No evidence of thrombus. No evidence of hemodynamically significant stenosis. No evidence of pneumatosis. The liver appears normal in size without focal abnormality. Spleen is normal size and appears homogeneous.  Stomach is nondistended.  No adrenal mass.  Kidneys appear unremarkable.  No hydronephrosis.  Urinary bladder is within normal limits.  There is no bowel distention.  No pneumatosis intestinalis, pneumoperitoneum or lymphadenopathy.  No significant ascites.  The appendix appears within normal limits.  Gallbladder is nondistended.  Biliary tree is nondistended.  The pancreas appears homogeneous.   Abdominal vasculature is within normal limits. Subcutaneous fat and underlying musculature appear within normal limits.  There are no acute osseous abnormalities or destructive bone lesions.     Impression: Impression: 1.No acute cardiopulmonary process. No acute intra-abdominal or intrapelvic process. No evidence of aortic dissection. 2.Ancillary findings as described above. Electronically Signed: Delores Brewer MD  2/8/2025 1:34 AM EST  Workstation ID: [id number]    CT Angiogram Abdomen Pelvis    Result Date: 2/8/2025  CT ANGIOGRAM CHEST, CT ANGIOGRAM ABDOMEN PELVIS Date of Exam: 2/8/2025 12:58 AM EST Indication: rule out aortic dissection. Chest pain abdominal pain Comparison: 2/7/2025. Technique: CTA of the chest, abdomen and pelvis was performed after the uneventful intravenous administration of intravenous contrast. Reconstructed coronal and sagittal images were also obtained. In addition, a 3-D volume rendered image was created for interpretation. Automated exposure control and iterative reconstruction methods were used. Findings: CT Chest: The thyroid appears within normal limits. The trachea and the esophagus are unremarkable. The heart appears enlarged..  No mediastinal or hilar lymphadenopathy.  No significant pericardial effusion.  The aorta and great vessels appear within normal limits.  Pulmonary artery is unremarkable. No evidence of dissection. Atherosclerotic calcifications are present within the coronary arteries. No evidence of pulmonary embolism. There is no pneumothorax, pleural effusion or focal airspace consolidation. There are no suspicious lung nodules.  No significant pleural disease.  Central and segmental airways appear patent. Subcutaneous fat and underlying musculature appear within normal limits.  There are no acute osseous abnormalities or destructive bone lesions. CT Abdomen and Pelvis: Aorta appears unremarkable. No evidence of dissection. Mild atherosclerotic calcifications present.  The origins of the mesenteric vasculature and the renal vasculature appears patent. No evidence of thrombus. No evidence of hemodynamically significant stenosis. No evidence of pneumatosis. The liver appears normal in size without focal abnormality. Spleen is normal size and appears homogeneous.  Stomach is nondistended.  No adrenal mass.  Kidneys appear unremarkable.  No hydronephrosis.  Urinary bladder is within normal limits.  There is no bowel distention.  No pneumatosis intestinalis, pneumoperitoneum or lymphadenopathy.  No significant ascites.  The appendix appears within normal limits.  Gallbladder is nondistended.  Biliary tree is nondistended.  The pancreas appears homogeneous.  Abdominal vasculature is within normal limits. Subcutaneous fat and underlying musculature appear within normal limits.  There are no acute osseous abnormalities or destructive bone lesions.     Impression: Impression: 1.No acute cardiopulmonary process. No acute intra-abdominal or intrapelvic process. No evidence of aortic dissection. 2.Ancillary findings as described above. Electronically Signed: Delores Brewer MD  2/8/2025 1:34 AM EST  Workstation ID: [id number]    XR Chest 1 View    Result Date: 2/7/2025  XR CHEST 1 VW Date of Exam: 2/7/2025 10:06 PM EST Indication: Chest Pain Triage Protocol Comparison: Chest x-ray 1/2/2025 Findings: Cardiomegaly. Postoperative change of the heart. Lungs normally expanded. No pneumothorax or pleural effusion or focal pulmonary parenchymal opacity.     Impression: Impression: Cardiomegaly with postoperative changes of the heart. No acute cardiopulmonary abnormality. Electronically Signed: Grecia Espinosa MD  2/7/2025 10:09 PM EST  Workstation ID: [id number]         Assessment & Plan   Assessment & Plan       Chest pain    Arteriosclerosis of coronary artery    Dyslipidemia, goal LDL below 70    S/P CABG x 5    Type 2 diabetes mellitus with diabetic neuropathy, without long-term current use of insulin     Hypokalemia    Hypomagnesemia    Chest pain w/ palpitations  CAD s/p CABG x 5  - continued pain despite norco and morphine, L chest wall reproducible and musculoskeletal in nature, will try toradol  - chest xray w/ cardiomegaly, no acute findings  - CTA chest/abd/pelvis w/ no PE/dissection; no acute process  - check esr / crp  - repeat troponin since continued pain, initial trop 27 w/ repeat 26  - NPO  - cardiology consult  - ECHO in am  - full dose aspirin, continue w/ daily 81 mg  - continue plavix, check P2y12     HTN / HLD  - BP elevated on arrival, 201/100  - s/p clonidine 0.1 mg and hydralazine 10 mg in ED  - continue amlodipine, metoprolol, losartan    Hypokalemia  Hypomagnesemia  - K 3.4  - check mag, 1.6  - replace electrolytes    T2DM - non insulin dependent, uncontrolled  - glucose 264  - fsbg Q 6 hours while NPO w/ moderate dose ssi  - hold jardiance, glimiperide, metformin  - check A1c, last check noted was 9.3 2/28/2024       DVT prophylaxis:  SCDS    CODE STATUS:     Code Status (Patient has no pulse and is not breathing): CPR (Attempt to Resuscitate)  Medical Interventions (Patient has pulse or is breathing): Full Support      Expected Discharge    Expected Discharge Date: 2/9/2025; Expected Discharge Time:            Signature: Electronically signed by KING Koenig, 02/08/25, 3:04 AM EST      Total time spent: 60 minutes  Time spent includes time reviewing chart, face-to-face time, counseling patient/family/caregiver, ordering medications/tests/procedures, communicating with other health care professionals, documenting clinical information in the electronic health record, and coordination of care.

## 2025-02-08 NOTE — ED PROVIDER NOTES
Subjective   History of Present Illness  49 year old male with history of HTN, DM, CAD status post CABG in [place], presents to the emergency department accompanied by his wife with concerns about 8/10 left sided chest pain radiating to his left arm, left shoulder, left neck, and left jaw, with associated mild shortness of breath since approximately 1300. Symptoms feel similar to prior cardiac symptoms. He takes Cialis, last dose two days ago. He recently relocated to this area and reports increased stress recently surrounding moving, buying a car, painting a house, etc. He did not take his usual evening medications. He denies increased sodium intake recently. He states his blood sugars are chronically not well controlled.  RLE DVT 3 years ago provoked after CABG, no longer takes anticoagulants. He reports gradual onset headache and mild dizziness.  His blood pressure typically runs 130's/80's at baseline per patient.      Review of Systems   Constitutional:  Negative for diaphoresis and fever.   HENT:  Negative for facial swelling and nosebleeds.    Eyes:  Negative for photophobia and discharge.   Respiratory:  Positive for shortness of breath. Negative for stridor.    Cardiovascular:  Positive for chest pain. Negative for leg swelling.   Neurological:  Positive for dizziness and headaches.       Past Medical History:   Diagnosis Date    Diabetes mellitus     Heart disease     Hypertension    CAD  DVT    No Known Allergies    Past Surgical History:   Procedure Laterality Date    CARDIAC SURGERY      KNEE ARTHROSCOPY     CABG    No family history on file.    Social History     Socioeconomic History    Marital status:    Tobacco Use    Smoking status: Some Days     Current packs/day: 0.50     Types: Cigarettes    Smokeless tobacco: Never   Vaping Use    Vaping status: Never Used   Substance and Sexual Activity    Alcohol use: Yes     Alcohol/week: 2.0 standard drinks of alcohol     Types: 2 Shots of liquor  per week    Drug use: No    Sexual activity: Defer     Employed, works in [place]/commutes      Objective   Physical Exam  Vitals and nursing note reviewed.   Constitutional:       Appearance: He is not diaphoretic.      Comments: BMI 34. Patient initially evaluated in the provider in triage area due to a full emergency department.   Eyes:      Comments: No photophobia or nystagmus.   Neck:      Trachea: No tracheal deviation.   Cardiovascular:      Rate and Rhythm: Normal rate and regular rhythm.      Heart sounds: No murmur heard.     No friction rub. No gallop.      Comments: S1, S2  Pulmonary:      Effort: Pulmonary effort is normal. No tachypnea or respiratory distress.      Breath sounds: Normal breath sounds. No stridor. No decreased breath sounds, wheezing, rhonchi or rales.   Musculoskeletal:      Cervical back: Neck supple.      Comments: 2+ pulses bilateral radial areas, symmetric. No significant peripheral edema. No calf tenderness bilaterally.   Skin:     General: Skin is warm and dry.   Neurological:      Mental Status: He is alert.      Comments: Normal speech, no dysarthria. Moves all extremities. No facial droop.                     ED Course  ED Course as of 02/08/25 0215   Fri Feb 07, 2025 2135 WBC: 7.29 [LD]   2135 Hemoglobin(!): 11.4 [LD]   2135 Platelets: 351 [LD]   2246 HS Troponin T(!): 27 [LD]   2246 Potassium(!): 3.4 [LD]   2246 Glucose(!): 264 [LD]   2246 BUN: 6 [LD]   2246 Creatinine: 1.00 [LD]   2246 proBNP: 632.2 [LD]   2246 Lipase: 46 [LD]   2247 Patient remains in the waiting area, awaiting 2nd troponin result and medication administration, BP rechecks.  [LD]   Sat Feb 08, 2025   0022 HS Troponin T(!): 26 [LD]   0022 Troponin T Numeric Delta: -1 [LD]   0022 BP(!): 199/113 [LD]   0027 8/10 CP, declines nitro, last Cialis was two days ago.  [LD]   0028 Results and plan discussed with the patient. All questions addressed.  [LD]   0029 He has a cardiologist in [place] with [facility].  [LD]   0035 Creatinine: 1.00 [LD]   0215 BP: 176/100 [LD]      ED Course User Index  [LD] Laura Barbour MD                  HEART Score: 7   Shared Decision Making  I discussed the findings with the patient/patient representative who is in agreement with the treatment plan and the final disposition.  Risks and benefits of discharge and/or observation/admission were discussed: Yes                                      Medical Decision Making  Differential diagnosis includes acute coronary syndrome, hypertensive urgency, hypertensive emergency, poorly controlled hypertension, aortic dissection considered less likely, pulmonary embolus considered less likely, musculoskeletal pain, esophageal spasm, esophagitis, GERD, electrolyte abnormality, and others.    Problems Addressed:  History of coronary artery disease: complicated acute illness or injury  Hypokalemia: complicated acute illness or injury  Poorly-controlled hypertension: complicated acute illness or injury  Precordial chest pain: complicated acute illness or injury    Amount and/or Complexity of Data Reviewed  Independent Historian: spouse     Details: At bedside  Labs: ordered. Decision-making details documented in ED Course.  Radiology: ordered. Decision-making details documented in ED Course.  ECG/medicine tests: ordered and independent interpretation performed. Decision-making details documented in ED Course.     Details: EKG at 2124 shows normal sinus rhythm at a rate of 73 beats per minute, normal intervals, nonspecific ST/T wave changes. Repeat EKG at 0002 shows sinus bradycardia at a rate of 57 beats per minute, normal intervals, nonspecific ST/T wave changes.  Discussion of management or test interpretation with external provider(s): Hospitalist contacted.     Risk  OTC drugs.  Prescription drug management.  Parenteral controlled substances.  Decision regarding hospitalization.      Recent Results (from the past 24 hours)   ECG 12 Lead ED Triage  Standing Order; Chest Pain    Collection Time: 02/07/25  9:24 PM   Result Value Ref Range    QT Interval 360 ms    QTC Interval 396 ms   High Sensitivity Troponin T    Collection Time: 02/07/25  9:27 PM    Specimen: Blood   Result Value Ref Range    HS Troponin T 27 (H) <22 ng/L   Comprehensive Metabolic Panel    Collection Time: 02/07/25  9:27 PM    Specimen: Blood   Result Value Ref Range    Glucose 264 (H) 65 - 99 mg/dL    BUN 6 6 - 20 mg/dL    Creatinine 1.00 0.76 - 1.27 mg/dL    Sodium 143 136 - 145 mmol/L    Potassium 3.4 (L) 3.5 - 5.2 mmol/L    Chloride 103 98 - 107 mmol/L    CO2 26.0 22.0 - 29.0 mmol/L    Calcium 9.5 8.6 - 10.5 mg/dL    Total Protein 7.2 6.0 - 8.5 g/dL    Albumin 4.5 3.5 - 5.2 g/dL    ALT (SGPT) 22 1 - 41 U/L    AST (SGOT) 21 1 - 40 U/L    Alkaline Phosphatase 89 39 - 117 U/L    Total Bilirubin 0.6 0.0 - 1.2 mg/dL    Globulin 2.7 gm/dL    A/G Ratio 1.7 g/dL    BUN/Creatinine Ratio 6.0 (L) 7.0 - 25.0    Anion Gap 14.0 5.0 - 15.0 mmol/L    eGFR 91.7 >60.0 mL/min/1.73   Lipase    Collection Time: 02/07/25  9:27 PM    Specimen: Blood   Result Value Ref Range    Lipase 46 13 - 60 U/L   BNP    Collection Time: 02/07/25  9:27 PM    Specimen: Blood   Result Value Ref Range    proBNP 632.2 0.0 - 900.0 pg/mL   Green Top (Gel)    Collection Time: 02/07/25  9:27 PM   Result Value Ref Range    Extra Tube Hold for add-ons.    Lavender Top    Collection Time: 02/07/25  9:27 PM   Result Value Ref Range    Extra Tube hold for add-on    Gold Top - SST    Collection Time: 02/07/25  9:27 PM   Result Value Ref Range    Extra Tube Hold for add-ons.    Gray Top    Collection Time: 02/07/25  9:27 PM   Result Value Ref Range    Extra Tube Hold for add-ons.    Light Blue Top    Collection Time: 02/07/25  9:27 PM   Result Value Ref Range    Extra Tube Hold for add-ons.    CBC Auto Differential    Collection Time: 02/07/25  9:27 PM    Specimen: Blood   Result Value Ref Range    WBC 7.29 3.40 - 10.80 10*3/mm3    RBC  3.76 (L) 4.14 - 5.80 10*6/mm3    Hemoglobin 11.4 (L) 13.0 - 17.7 g/dL    Hematocrit 36.0 (L) 37.5 - 51.0 %    MCV 95.7 79.0 - 97.0 fL    MCH 30.3 26.6 - 33.0 pg    MCHC 31.7 31.5 - 35.7 g/dL    RDW 12.6 12.3 - 15.4 %    RDW-SD 43.7 37.0 - 54.0 fl    MPV 10.2 6.0 - 12.0 fL    Platelets 351 140 - 450 10*3/mm3    Neutrophil % 57.0 42.7 - 76.0 %    Lymphocyte % 33.2 19.6 - 45.3 %    Monocyte % 6.2 5.0 - 12.0 %    Eosinophil % 2.5 0.3 - 6.2 %    Basophil % 0.4 0.0 - 1.5 %    Immature Grans % 0.7 (H) 0.0 - 0.5 %    Neutrophils, Absolute 4.16 1.70 - 7.00 10*3/mm3    Lymphocytes, Absolute 2.42 0.70 - 3.10 10*3/mm3    Monocytes, Absolute 0.45 0.10 - 0.90 10*3/mm3    Eosinophils, Absolute 0.18 0.00 - 0.40 10*3/mm3    Basophils, Absolute 0.03 0.00 - 0.20 10*3/mm3    Immature Grans, Absolute 0.05 0.00 - 0.05 10*3/mm3    nRBC 0.0 0.0 - 0.2 /100 WBC   High Sensitivity Troponin T 1Hr    Collection Time: 02/07/25 11:27 PM    Specimen: Blood   Result Value Ref Range    HS Troponin T 26 (H) <22 ng/L    Troponin T Numeric Delta -1 ng/L    Troponin T % Delta -4 Abnormal if >/= 20%   ECG 12 Lead ED Triage Standing Order; Chest Pain    Collection Time: 02/08/25 12:02 AM   Result Value Ref Range    QT Interval 422 ms    QTC Interval 410 ms     Note: In addition to lab results from this visit, the labs listed above may include labs taken at another facility or during a different encounter within the last 24 hours. Please correlate lab times with ED admission and discharge times for further clarification of the services performed during this visit.    CT Angiogram Chest   Final Result   Impression:   1.No acute cardiopulmonary process. No acute intra-abdominal or intrapelvic process. No evidence of aortic dissection.   2.Ancillary findings as described above.                  Electronically Signed: Delores Brewer MD     2/8/2025 1:34 AM EST     Workstation ID: [id number]      CT Angiogram Abdomen Pelvis   Final Result   Impression:   1.No acute  cardiopulmonary process. No acute intra-abdominal or intrapelvic process. No evidence of aortic dissection.   2.Ancillary findings as described above.                  Electronically Signed: Delores Brewer MD     2/8/2025 1:34 AM EST     Workstation ID: [id number]      XR Chest 1 View   Final Result   Impression:   Cardiomegaly with postoperative changes of the heart. No acute cardiopulmonary abnormality.         Electronically Signed: Grecia Espinosa MD     2/7/2025 10:09 PM EST     Workstation ID: [id number]        Vitals:    02/08/25 0000 02/08/25 0004 02/08/25 0005 02/08/25 0054   BP: (!) 199/113 (!) 199/113  176/100   BP Location:       Patient Position:       Pulse: 61 62 64 62   Resp:       Temp:       TempSrc:       SpO2: 99% 97% 98%    Weight:       Height:         Medications   sodium chloride 0.9 % flush 10 mL (has no administration in time range)   aspirin chewable tablet 324 mg (243 mg Oral Given 2/8/25 0002)   cloNIDine (CATAPRES) tablet 0.1 mg (0.1 mg Oral Given 2/8/25 0004)   HYDROcodone-acetaminophen (NORCO) 5-325 MG per tablet 1 tablet (1 tablet Oral Given 2/8/25 0006)   ondansetron ODT (ZOFRAN-ODT) disintegrating tablet 4 mg (4 mg Oral Given 2/8/25 0006)   potassium chloride (MICRO-K/KLOR-CON) CR capsule (40 mEq Oral Given 2/8/25 0007)   hydrALAZINE (APRESOLINE) injection 10 mg (10 mg Intravenous Given 2/8/25 0054)   morphine injection 2 mg (2 mg Intravenous Given 2/8/25 0103)   iopamidol (ISOVUE-370) 76 % injection 85 mL (85 mL Intravenous Given 2/8/25 0118)     ECG/EMG Results (last 24 hours)       Procedure Component Value Units Date/Time    ECG 12 Lead ED Triage Standing Order; Chest Pain [[id number]] Collected: 02/07/25 2124     Updated: 02/07/25 2123     QT Interval 360 ms      QTC Interval 396 ms     Narrative:      Test Reason : ED Triage Standing Order~  Blood Pressure :   */*   mmHG  Vent. Rate :  73 BPM     Atrial Rate :  73 BPM     P-R Int : 170 ms          QRS Dur :  96 ms      QT Int : 360 ms        P-R-T Axes :  41  11 126 degrees    QTcB Int : 396 ms    Normal sinus rhythm  Possible Left atrial enlargement  ST & T wave abnormality, consider lateral ischemia  Abnormal ECG  When compared with ECG of 02-Jan-2025 22:31,  Inverted T waves have replaced nonspecific T wave abnormality in Lateral  leads  QT has shortened    Referred By: ED MD           Confirmed By:           ECG 12 Lead ED Triage Standing Order; Chest Pain   Preliminary Result   Test Reason : ED Triage Standing Order~   Blood Pressure :   */*   mmHG   Vent. Rate :  57 BPM     Atrial Rate :  57 BPM      P-R Int : 172 ms          QRS Dur :  98 ms       QT Int : 422 ms       P-R-T Axes :  45  19  95 degrees     QTcB Int : 410 ms      Sinus bradycardia   Possible Left atrial enlargement   ST & T wave abnormality, consider lateral ischemia   Abnormal ECG   When compared with ECG of 07-Feb-2025 21:24, (Unconfirmed)   No significant change was found      Referred By: edmd           Confirmed By:       ECG 12 Lead ED Triage Standing Order; Chest Pain   Preliminary Result   Test Reason : ED Triage Standing Order~   Blood Pressure :   */*   mmHG   Vent. Rate :  73 BPM     Atrial Rate :  73 BPM      P-R Int : 170 ms          QRS Dur :  96 ms       QT Int : 360 ms       P-R-T Axes :  41  11 126 degrees     QTcB Int : 396 ms      Normal sinus rhythm   Possible Left atrial enlargement   ST & T wave abnormality, consider lateral ischemia   Abnormal ECG   When compared with ECG of 02-Jan-2025 22:31,   Inverted T waves have replaced nonspecific T wave abnormality in Lateral   leads   QT has shortened      Referred By: ED MD           Confirmed By:               Final diagnoses:   Precordial chest pain   Poorly-controlled hypertension   History of coronary artery disease   Hypokalemia       ED Disposition  ED Disposition       ED Disposition   Decision to Admit    Condition   --    Comment   Level of Care: Telemetry [5]   Diagnosis: Chest pain [[id number]]    Admitting Physician: NISHI WOOD [[id number]]   Attending Physician: NISHI WOOD [[id number]]                 No follow-up provider specified.       Medication List      No changes were made to your prescriptions during this visit.            Laura Barbour MD  02/11/25 3271

## 2025-02-08 NOTE — CONSULTS
[facility], Inpatient Consult  Date of Hospital Visit: 25  Encounter Provider: Melina Higuera MD    Place of Service: [facility]  Patient Name: Aureliano Blake  :1975  MRN: [id number]     Primary Care Provider: Aliya Lee NP    Chief complaint: Chest pain    PROBLEM LIST    CARDIAC  Coronary Artery Disease:   CABG X5 10/2022: LIMA to the LAD, SVG to diagonal, SVG to OM1 and OM 2, SVG to PDA  LHC 2022: Patent 2/5 grafts, LIMA to the LAD and SVG to the RCA    Myocardium:   Preop EF 52% prior to CABG  Echo 2023: EF 45-50    Valvular:   No valvular disease    Electrical:   NSR    Percardium:   Normal    CARDIAC RISK FACTORS:  Hypertension  Diabetes  Dyslipidemia  Obesity  Tobacco Use: Current Smoker  Obstructive Sleep Apnea      NON-CARDIAC:  Marijuana use  Anxiety  Irritable bowel syndrome  Arthritis    SURGERIES:  CABG X5  Knee arthroscopies    History of Present Illness:  Patient is a 49-year-old history of CABG X5 in , hypertension, hyperlipidemia, uncontrolled type 2 diabetes, tobacco use presented to the hospital last evening with chest pain.  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  He does have intermittent chronic angina due to small vessel disease.  He did not take nitroglycerin as he had recently taken Cialis.  When his pain did not get any better he presented to the emergency department.  He was noted to be extremely hypertensive.  He states he never checks his blood pressure at home but does take his medications as prescribed.  Blood pressure was noted to be 200/100.  Patient was given aspirin, clonidine, hydralazine, Norco and morphine with improvement of his pain.  Patient states he has persistent pain.  It was noted by the providers early this morning patient had reproducible pain.  Today he states that it does intermittently hurt with deep inspiration.  Patient does admit he does not  have a cardiologist locally because he works in [place] but lives here.  He has not been seen by cardiologist since last year.  He did recently have blood work showing improvement of his hemoglobin A1c from 11 back in June to 9.  He has been on Ozempic which has helped with that but he has not had any significant weight loss.  Patient has been on aspirin and Plavix since he underwent heart catheterization shortly after his CABG noting that he had multiple vein grafts that were down.      I reviewed patient's past medical history, surgical history, family history and social history.    Current Outpatient Medications   Medication Instructions    amLODIPine (NORVASC) 10 mg, Daily    aspirin 81 mg, Daily    atorvastatin (LIPITOR) 80 mg, Daily    buPROPion SR (WELLBUTRIN SR) 150 mg, Oral, 2 Times Daily    clopidogrel (PLAVIX) 75 mg, Daily    Diclofenac Sodium (VOLTAREN) 4 g, Topical, 4 Times Daily PRN    empagliflozin (JARDIANCE) 25 mg, Daily    ezetimibe (ZETIA) 10 MG tablet Take 1 tablet by mouth Daily.    glimepiride (AMARYL) 4 mg, Daily    losartan (COZAAR) 100 mg, Nightly    metFORMIN ER (GLUCOPHAGE-XR) 1,000 mg, 2 Times Daily Before Meals    metoprolol succinate XL (TOPROL-XL) 50 mg, Daily    Semaglutide (2 MG/DOSE) (OZEMPIC) 2 mg, Subcutaneous, Weekly, Patient takes on Fridays          Scheduled Meds:amLODIPine, 10 mg, Oral, Daily  [START ON 2/9/2025] aspirin, 81 mg, Oral, Daily  atorvastatin, 80 mg, Oral, Daily  buPROPion SR, 150 mg, Oral, Q12H  clopidogrel, 75 mg, Oral, Daily  empagliflozin, 10 mg, Oral, Daily  famotidine, 40 mg, Oral, Daily  insulin regular, 2-9 Units, Subcutaneous, Q6H  losartan, 100 mg, Oral, Nightly  metoprolol succinate XL, 50 mg, Oral, Daily  nitroglycerin, 1 inch, Topical, Q6H  senna-docusate sodium, 2 tablet, Oral, BID  sodium chloride, 10 mL, Intravenous, Q12H  spironolactone, 25 mg, Oral, Daily      Continuous Infusions:       Review of Systems   Respiratory:  Positive for shortness  "of breath.    Cardiovascular:  Positive for chest pain.               Objective:     Vitals:    02/08/25 0900 02/08/25 0930 02/08/25 1133 02/08/25 1556   BP: 146/61 141/88 164/99 156/90   BP Location:   Right arm Left arm   Patient Position:   Lying Sitting   Pulse: 56 53 53 63   Resp:   18 18   Temp:   97.7 °F (36.5 °C) 98.1 °F (36.7 °C)   TempSrc:   Oral Oral   SpO2: 95% 96% 96%    Weight:       Height:           Body mass index is 34.7 kg/m².  Flowsheet Rows      Flowsheet Row First Filed Value   Admission Height 175.3 cm (69\") Documented at 02/07/2025 2110   Admission Weight 107 kg (235 lb) Documented at 02/07/2025 2110            Intake/Output Summary (Last 24 hours) at 2/8/2025 1641  Last data filed at 2/8/2025 1400  Gross per 24 hour   Intake 300 ml   Output --   Net 300 ml       Vitals reviewed.   Constitutional:       Appearance: Healthy appearance. Not in distress.   Eyes:      Conjunctiva/sclera: Conjunctivae normal.   HENT:    Mouth/Throat:      Pharynx: Oropharynx is clear.   Neck:      Vascular: JVD normal.   Pulmonary:      Effort: Pulmonary effort is normal.      Breath sounds: No wheezing. No rhonchi. No rales.   Cardiovascular:      Bradycardia present. Regular rhythm.      Murmurs: There is no murmur.      No rub.   Pulses:     Intact distal pulses.   Edema:     Peripheral edema absent.   Abdominal:      General: There is no distension.   Musculoskeletal:         General: No deformity. Skin:     General: Skin is warm and dry.   Neurological:      General: No focal deficit present.      Mental Status: Alert and oriented to person, place and time.           Lab Review:                CBC:      Lab 02/08/25  0532 02/07/25 2127   WBC 5.92 7.29   HEMOGLOBIN 10.2* 11.4*   HEMATOCRIT 32.4* 36.0*   PLATELETS 321 351   NEUTROS ABS 2.96 4.16   IMMATURE GRANS (ABS) 0.02 0.05   LYMPHS ABS 2.31 2.42   MONOS ABS 0.45 0.45   EOS ABS 0.15 0.18   MCV 96.7 95.7     CMP:        Lab 02/08/25  0532 02/07/25  2327 " "02/07/25 2127   SODIUM 141  --  143   POTASSIUM 3.7  --  3.4*   CHLORIDE 105  --  103   CO2 25.0  --  26.0   ANION GAP 11.0  --  14.0   BUN 8  --  6   CREATININE 0.93  --  1.00   EGFR 100.0  --  91.7   GLUCOSE 181*  --  264*   CALCIUM 8.7  --  9.5   MAGNESIUM  --  1.6  --    TOTAL PROTEIN  --   --  7.2   ALBUMIN  --   --  4.5   GLOBULIN  --   --  2.7   ALT (SGPT)  --   --  22   AST (SGOT)  --   --  21   BILIRUBIN  --   --  0.6   ALK PHOS  --   --  89   LIPASE  --   --  46     Results from last 7 days   Lab Units 02/07/25  2327   MAGNESIUM mg/dL 1.6     Lab Results   Component Value Date    HGBA1C 8.10 (H) 02/07/2025     Estimated Creatinine Clearance: 114.5 mL/min (by C-G formula based on SCr of 0.93 mg/dL).  No results found for: \"DDIMER\"        Lab 02/08/25  0532 02/08/25  0409 02/07/25  2327 02/07/25 2127   PROBNP  --   --   --  632.2   HSTROP T 33* 28* 26* 27*       Lab Results   Component Value Date    CHOL 87 02/08/2025    TRIG 129 02/08/2025    HDL 26 (L) 02/08/2025    LDL 38 02/08/2025         CT Angiogram Chest    Result Date: 2/8/2025  Impression: 1.No acute cardiopulmonary process. No acute intra-abdominal or intrapelvic process. No evidence of aortic dissection. 2.Ancillary findings as described above. Electronically Signed: Delores Brewer MD  2/8/2025 1:34 AM EST  Workstation ID: [id number]    CT Angiogram Abdomen Pelvis    Result Date: 2/8/2025  Impression: 1.No acute cardiopulmonary process. No acute intra-abdominal or intrapelvic process. No evidence of aortic dissection. 2.Ancillary findings as described above. Electronically Signed: Delores Brewer MD  2/8/2025 1:34 AM EST  Workstation ID: [id number]    XR Chest 1 View    Result Date: 2/7/2025  Impression: Cardiomegaly with postoperative changes of the heart. No acute cardiopulmonary abnormality. Electronically Signed: Grecia Espinosa MD  2/7/2025 10:09 PM EST  Workstation ID: [id number]      Results for orders placed during the hospital encounter of " 02/07/25    Adult Transthoracic Echo Complete With Contrast if Necessary Per Protocol    Interpretation Summary    Left ventricular ejection fraction appears to be 51 - 55%.    The right ventricular cavity is mildly dilated.    Estimated right ventricular systolic pressure from tricuspid regurgitation is normal (<35 mmHg).    Normal left atrial size and volume noted.       EKG: Sinus bradycardia, heart rate 56 bpm    Result Review:  I have personally reviewed the results from the time of admission and agree with these findings:  [x]  Laboratory  [x]  Radiology  [x]  EKG/Telemetry   []  Pathology  []  Old records  []  Other:             Assessment:   Chest pain with mildly elevated troponins  CAD s/p coronary artery bypass graft in 2022.  Hypertension  Hyperlipidemia  Diabetes  Sleep apnea  Ongoing nicotine addiction      Plan:   Patient has severe coronary artery disease and has been treated medically because of invasive nature of his disease.  Patient is still smoking.  Patient cholesterol is running good.  Patient diabetes is much better from before.  Patient has sleep apnea and does not use CPAP.  Patient also have gained some weight.  I have long discussion with the patient I think at this time we will treat him medically.  If he continues to have pain he will need heart catheterization.  He needs to quit smoking.  He needs to start using his CPAP more often.  Patient blood pressure needs to be under better control.  We will add spironolactone.  We will also add Farxiga to his treatment plan.  Will see him back in the clinic.  He should be able to go home in the morning.      Melina Higuera MD       STAFF CARDIOLOGIST:      SUMMARY:    49 years old with severe coronary artery disease and multiple uncontrolled risk factors here for chest pain and mildly elevated troponin      PERTINENT:    Troponin around 30  EF around 45  Hemoglobin A1c 8.2  Smokes      IMPRESSION:    Ischemic  cardiomyopathy  Hypertension  Diabetes  Hyperlipidemia      RECOMMENDATIONS:    Patient needs to change his lifestyle and needs further behavior modification.  We will go ahead and add spironolactone  We we will go ahead and add Farxiga.  If he continues to have pain we will need to go ahead and do a left heart catheterization for further evaluation.  If he remains stable in the morning he should be able to go home and I will see him back in the clinic.        I have seen and examined the patient, reviewed the above note, necessary changes were made and I agree with the final note.   Melina Higuera MD

## 2025-02-09 VITALS
DIASTOLIC BLOOD PRESSURE: 87 MMHG | WEIGHT: 240.2 LBS | HEART RATE: 61 BPM | HEIGHT: 69 IN | BODY MASS INDEX: 35.58 KG/M2 | TEMPERATURE: 97.7 F | OXYGEN SATURATION: 97 % | SYSTOLIC BLOOD PRESSURE: 149 MMHG | RESPIRATION RATE: 18 BRPM

## 2025-02-09 LAB
GLUCOSE BLDC GLUCOMTR-MCNC: 156 MG/DL (ref 70–130)
GLUCOSE BLDC GLUCOMTR-MCNC: 200 MG/DL (ref 70–130)
GLUCOSE BLDC GLUCOMTR-MCNC: 207 MG/DL (ref 70–130)

## 2025-02-09 PROCEDURE — 82948 REAGENT STRIP/BLOOD GLUCOSE: CPT

## 2025-02-09 PROCEDURE — 99238 HOSP IP/OBS DSCHRG MGMT 30/<: CPT | Performed by: INTERNAL MEDICINE

## 2025-02-09 PROCEDURE — G0378 HOSPITAL OBSERVATION PER HR: HCPCS

## 2025-02-09 PROCEDURE — 63710000001 INSULIN REGULAR HUMAN PER 5 UNITS: Performed by: NURSE PRACTITIONER

## 2025-02-09 PROCEDURE — 63710000001 INSULIN LISPRO (HUMAN) PER 5 UNITS: Performed by: INTERNAL MEDICINE

## 2025-02-09 RX ORDER — DEXTROSE MONOHYDRATE 25 G/50ML
25 INJECTION, SOLUTION INTRAVENOUS
Status: DISCONTINUED | OUTPATIENT
Start: 2025-02-09 | End: 2025-02-09 | Stop reason: HOSPADM

## 2025-02-09 RX ORDER — SPIRONOLACTONE 25 MG/1
25 TABLET ORAL DAILY
Qty: 30 TABLET | Refills: 0 | Status: SHIPPED | OUTPATIENT
Start: 2025-02-09

## 2025-02-09 RX ORDER — NICOTINE POLACRILEX 4 MG
15 LOZENGE BUCCAL
Status: DISCONTINUED | OUTPATIENT
Start: 2025-02-09 | End: 2025-02-09 | Stop reason: HOSPADM

## 2025-02-09 RX ORDER — IBUPROFEN 600 MG/1
1 TABLET ORAL
Status: DISCONTINUED | OUTPATIENT
Start: 2025-02-09 | End: 2025-02-09 | Stop reason: HOSPADM

## 2025-02-09 RX ORDER — INSULIN LISPRO 100 [IU]/ML
2-9 INJECTION, SOLUTION INTRAVENOUS; SUBCUTANEOUS
Status: DISCONTINUED | OUTPATIENT
Start: 2025-02-09 | End: 2025-02-09 | Stop reason: HOSPADM

## 2025-02-09 RX ADMIN — Medication 10 ML: at 09:28

## 2025-02-09 RX ADMIN — ATORVASTATIN CALCIUM 80 MG: 40 TABLET, FILM COATED ORAL at 09:26

## 2025-02-09 RX ADMIN — EMPAGLIFLOZIN 10 MG: 10 TABLET, FILM COATED ORAL at 09:26

## 2025-02-09 RX ADMIN — BUPROPION HYDROCHLORIDE 150 MG: 150 TABLET, FILM COATED, EXTENDED RELEASE ORAL at 09:25

## 2025-02-09 RX ADMIN — AMLODIPINE BESYLATE 10 MG: 10 TABLET ORAL at 09:26

## 2025-02-09 RX ADMIN — INSULIN HUMAN 4 UNITS: 100 INJECTION, SOLUTION PARENTERAL at 06:17

## 2025-02-09 RX ADMIN — SPIRONOLACTONE 25 MG: 25 TABLET ORAL at 09:26

## 2025-02-09 RX ADMIN — INSULIN HUMAN 4 UNITS: 100 INJECTION, SOLUTION PARENTERAL at 00:43

## 2025-02-09 RX ADMIN — CLOPIDOGREL BISULFATE 75 MG: 75 TABLET ORAL at 09:26

## 2025-02-09 RX ADMIN — NITROGLYCERIN 1 INCH: 20 OINTMENT TOPICAL at 00:43

## 2025-02-09 RX ADMIN — INSULIN LISPRO 2 UNITS: 100 INJECTION, SOLUTION INTRAVENOUS; SUBCUTANEOUS at 09:25

## 2025-02-09 RX ADMIN — ASPIRIN 81 MG: 81 TABLET, COATED ORAL at 09:26

## 2025-02-09 RX ADMIN — FAMOTIDINE 40 MG: 20 TABLET, FILM COATED ORAL at 09:26

## 2025-02-09 RX ADMIN — SENNOSIDES AND DOCUSATE SODIUM 2 TABLET: 50; 8.6 TABLET ORAL at 09:26

## 2025-02-09 RX ADMIN — METOPROLOL SUCCINATE 50 MG: 50 TABLET, EXTENDED RELEASE ORAL at 09:26

## 2025-02-09 NOTE — PLAN OF CARE
Problem: Adult Inpatient Plan of Care  Goal: Plan of Care Review  Outcome: Progressing  Flowsheets (Taken 2/9/2025 0433)  Progress: improving  Plan of Care Reviewed With: patient  Goal: Patient-Specific Goal (Individualized)  Outcome: Progressing  Goal: Absence of Hospital-Acquired Illness or Injury  Outcome: Progressing  Intervention: Identify and Manage Fall Risk  Recent Flowsheet Documentation  Taken 2/9/2025 0428 by Josey Estrada RN  Safety Promotion/Fall Prevention:   activity supervised   assistive device/personal items within reach   safety round/check completed  Taken 2/9/2025 0226 by Josey Estrada RN  Safety Promotion/Fall Prevention:   activity supervised   assistive device/personal items within reach   safety round/check completed  Taken 2/9/2025 0049 by Josey Estrada RN  Safety Promotion/Fall Prevention:   activity supervised   assistive device/personal items within reach   safety round/check completed  Taken 2/8/2025 2200 by Josey Estrada RN  Safety Promotion/Fall Prevention:   activity supervised   assistive device/personal items within reach   safety round/check completed  Taken 2/8/2025 2003 by Josey Estrada RN  Safety Promotion/Fall Prevention:   activity supervised   assistive device/personal items within reach   clutter free environment maintained   fall prevention program maintained   lighting adjusted   muscle strengthening facilitated   nonskid shoes/slippers when out of bed   safety round/check completed   room organization consistent  Intervention: Prevent Skin Injury  Recent Flowsheet Documentation  Taken 2/9/2025 0428 by Josey Estrada RN  Body Position: position changed independently  Taken 2/9/2025 0226 by Josey Estrada RN  Body Position:   side-lying   right   position changed independently  Taken 2/9/2025 0049 by Josey Estrada RN  Body Position: sitting up in bed  Taken 2/8/2025 2200 by Josey Estrada RN  Body Position: position changed independently  Taken  2/8/2025 2003 by Josey Estrada RN  Body Position:   sitting up in bed   position changed independently  Intervention: Prevent and Manage VTE (Venous Thromboembolism) Risk  Recent Flowsheet Documentation  Taken 2/8/2025 2003 by Josey Estrada RN  VTE Prevention/Management: patient refused intervention  Intervention: Prevent Infection  Recent Flowsheet Documentation  Taken 2/9/2025 0428 by Josey Estrada RN  Infection Prevention:   hand hygiene promoted   rest/sleep promoted  Taken 2/9/2025 0226 by Josey Estrada RN  Infection Prevention:   hand hygiene promoted   rest/sleep promoted  Taken 2/9/2025 0049 by Josey Estrada RN  Infection Prevention:   hand hygiene promoted   rest/sleep promoted  Taken 2/8/2025 2200 by Josey Estrada RN  Infection Prevention:   hand hygiene promoted   rest/sleep promoted  Taken 2/8/2025 2003 by Josey Estrada RN  Infection Prevention:   hand hygiene promoted   rest/sleep promoted  Goal: Optimal Comfort and Wellbeing  Outcome: Progressing  Intervention: Provide Person-Centered Care  Recent Flowsheet Documentation  Taken 2/9/2025 0428 by Josey Estrada RN  Trust Relationship/Rapport: care explained  Taken 2/9/2025 0226 by Josey Estrada RN  Trust Relationship/Rapport: care explained  Taken 2/9/2025 0049 by Josey Estrada RN  Trust Relationship/Rapport: care explained  Taken 2/8/2025 2200 by Josey Estrada RN  Trust Relationship/Rapport: care explained  Taken 2/8/2025 2003 by Josey Estrada RN  Trust Relationship/Rapport:   care explained   choices provided   questions answered   questions encouraged   reassurance provided  Goal: Readiness for Transition of Care  Outcome: Progressing     Problem: Comorbidity Management  Goal: Blood Glucose Level Within Target Range  Outcome: Progressing  Intervention: Monitor and Manage Glycemia  Recent Flowsheet Documentation  Taken 2/8/2025 2003 by Josey Estrada RN  Medication Review/Management: medications reviewed  Goal:  Blood Pressure in Desired Range  Outcome: Progressing  Intervention: Maintain Blood Pressure Management  Recent Flowsheet Documentation  Taken 2/8/2025 2003 by Josey Estrada RN  Medication Review/Management: medications reviewed   Goal Outcome Evaluation:  Plan of Care Reviewed With: patient      Pt currently in bed resting quietly. Complaints of pain to left chest throughout shift. Pt denies any worsening of symptoms. Nitro paste used. Monitoring troponin, may discharge home soon. VSS. Spouse at bedside. No other observations at this time, call bell in reach.  Progress: improving

## 2025-02-09 NOTE — DISCHARGE SUMMARY
"    [facility] Medicine Services  DISCHARGE SUMMARY    Patient Name: Aureliano Blake  : 1975  MRN: [id number]    Date of Admission: 2025 11:26 PM  Date of Discharge: 2025  Primary Care Physician: Aliya Lee, NP    Consults       Date and Time Order Name Status Description    2025  4:28 AM Inpatient Cardiology Consult Completed             Hospital Course       Active Hospital Problems    Diagnosis  POA    **Chest pain [R07.9]  Yes    Hypokalemia [E87.6]  Unknown    Hypomagnesemia [E83.42]  Unknown    Type 2 diabetes mellitus with diabetic neuropathy, without long-term current use of insulin [E11.40]  Yes    S/P CABG x 5 [Z95.1]  Not Applicable    Dyslipidemia, goal LDL below 70 [E78.5]  Yes    Arteriosclerosis of coronary artery [I25.10]  Yes      Resolved Hospital Problems   No resolved problems to display.          Hospital Course:  Aureliano Blake is a 49 y.o. male w/ prior DVT, CAD s/p CABG x 5, HTN, HLD, DM2 with elevated A1c (8.1%), active tobacco abuse, who presented with LT sided chest pain.  High-sensitivity troponin was 27 with follow-up of 26.  Troponins were repeated several times and resulted 28 and 33; ruling out ACS.  He was seen by cardiology, they recommended tobacco cessation, better control of his diabetes, they added spironolactone to his regimen.  Further recommendations he may be discharged if chest pain resolved.  Overnight his chest pain has improved and he is asking to be discharged home.    Discharge Follow Up Recommendations for outpatient labs/diagnostics:  PCP 1-2  Cardiology 1-2 months    Day of Discharge     HPI:   Chest pain is resolved.  States that he feels fine and has \"things to do.\"  Asking to be discharged home today.    Vital Signs:   Temp:  [97.4 °F (36.3 °C)-98.1 °F (36.7 °C)] 97.7 °F (36.5 °C)  Heart Rate:  [58-74] 61  Resp:  [16-18] 18  BP: (143-156)/(87-92) 149/87      Physical Exam:  Constitutional: Awake, alert, sitting " on edge of bed fully clothed in NAD  Respiratory: Clear to auscultation bilaterally, respiratory effort normal   Cardiovascular: RRR, palpable radial pulse  Gastrointestinal: Nondistended abdomen  Musculoskeletal: No bilateral ankle edema  Psychiatric: Appropriate affect, cooperative    Pertinent  and/or Most Recent Results     LAB RESULTS:      Lab 02/08/25 0532 02/07/25 2327 02/07/25 2127   WBC 5.92  --  7.29   HEMOGLOBIN 10.2*  --  11.4*   HEMATOCRIT 32.4*  --  36.0*   PLATELETS 321  --  351   NEUTROS ABS 2.96  --  4.16   IMMATURE GRANS (ABS) 0.02  --  0.05   LYMPHS ABS 2.31  --  2.42   MONOS ABS 0.45  --  0.45   EOS ABS 0.15  --  0.18   MCV 96.7  --  95.7   SED RATE  --   --  34*   CRP  --  0.30  --          Lab 02/08/25 1910 02/08/25 0532 02/07/25 2327 02/07/25 2127   SODIUM  --  141  --  143   POTASSIUM 3.9 3.7  --  3.4*   CHLORIDE  --  105  --  103   CO2  --  25.0  --  26.0   ANION GAP  --  11.0  --  14.0   BUN  --  8  --  6   CREATININE  --  0.93  --  1.00   EGFR  --  100.0  --  91.7   GLUCOSE  --  181*  --  264*   CALCIUM  --  8.7  --  9.5   MAGNESIUM  --   --  1.6  --    HEMOGLOBIN A1C  --   --   --  8.10*   TSH  --   --  2.090  --          Lab 02/07/25 2127   TOTAL PROTEIN 7.2   ALBUMIN 4.5   GLOBULIN 2.7   ALT (SGPT) 22   AST (SGOT) 21   BILIRUBIN 0.6   ALK PHOS 89   LIPASE 46         Lab 02/08/25  0532 02/08/25 0409 02/07/25 2327 02/07/25 2127   PROBNP  --   --   --  632.2   HSTROP T 33* 28* 26* 27*         Lab 02/08/25 0532   CHOLESTEROL 87   LDL CHOL 38   HDL CHOL 26*   TRIGLYCERIDES 129             Brief Urine Lab Results       None          Microbiology Results (last 10 days)       ** No results found for the last 240 hours. **            CT Angiogram Chest    Result Date: 2/8/2025  CT ANGIOGRAM CHEST, CT ANGIOGRAM ABDOMEN PELVIS Date of Exam: 2/8/2025 12:58 AM EST Indication: rule out aortic dissection. Chest pain abdominal pain Comparison: 2/7/2025. Technique: CTA of the chest, abdomen  and pelvis was performed after the uneventful intravenous administration of intravenous contrast. Reconstructed coronal and sagittal images were also obtained. In addition, a 3-D volume rendered image was created for interpretation. Automated exposure control and iterative reconstruction methods were used. Findings: CT Chest: The thyroid appears within normal limits. The trachea and the esophagus are unremarkable. The heart appears enlarged..  No mediastinal or hilar lymphadenopathy.  No significant pericardial effusion.  The aorta and great vessels appear within normal limits.  Pulmonary artery is unremarkable. No evidence of dissection. Atherosclerotic calcifications are present within the coronary arteries. No evidence of pulmonary embolism. There is no pneumothorax, pleural effusion or focal airspace consolidation. There are no suspicious lung nodules.  No significant pleural disease.  Central and segmental airways appear patent. Subcutaneous fat and underlying musculature appear within normal limits.  There are no acute osseous abnormalities or destructive bone lesions. CT Abdomen and Pelvis: Aorta appears unremarkable. No evidence of dissection. Mild atherosclerotic calcifications present. The origins of the mesenteric vasculature and the renal vasculature appears patent. No evidence of thrombus. No evidence of hemodynamically significant stenosis. No evidence of pneumatosis. The liver appears normal in size without focal abnormality. Spleen is normal size and appears homogeneous.  Stomach is nondistended.  No adrenal mass.  Kidneys appear unremarkable.  No hydronephrosis.  Urinary bladder is within normal limits.  There is no bowel distention.  No pneumatosis intestinalis, pneumoperitoneum or lymphadenopathy.  No significant ascites.  The appendix appears within normal limits.  Gallbladder is nondistended.  Biliary tree is nondistended.  The pancreas appears homogeneous.  Abdominal vasculature is within  normal limits. Subcutaneous fat and underlying musculature appear within normal limits.  There are no acute osseous abnormalities or destructive bone lesions.     Impression: 1.No acute cardiopulmonary process. No acute intra-abdominal or intrapelvic process. No evidence of aortic dissection. 2.Ancillary findings as described above. Electronically Signed: Delores Brewer MD  2/8/2025 1:34 AM EST  Workstation ID: [id number]    CT Angiogram Abdomen Pelvis    Result Date: 2/8/2025  CT ANGIOGRAM CHEST, CT ANGIOGRAM ABDOMEN PELVIS Date of Exam: 2/8/2025 12:58 AM EST Indication: rule out aortic dissection. Chest pain abdominal pain Comparison: 2/7/2025. Technique: CTA of the chest, abdomen and pelvis was performed after the uneventful intravenous administration of intravenous contrast. Reconstructed coronal and sagittal images were also obtained. In addition, a 3-D volume rendered image was created for interpretation. Automated exposure control and iterative reconstruction methods were used. Findings: CT Chest: The thyroid appears within normal limits. The trachea and the esophagus are unremarkable. The heart appears enlarged..  No mediastinal or hilar lymphadenopathy.  No significant pericardial effusion.  The aorta and great vessels appear within normal limits.  Pulmonary artery is unremarkable. No evidence of dissection. Atherosclerotic calcifications are present within the coronary arteries. No evidence of pulmonary embolism. There is no pneumothorax, pleural effusion or focal airspace consolidation. There are no suspicious lung nodules.  No significant pleural disease.  Central and segmental airways appear patent. Subcutaneous fat and underlying musculature appear within normal limits.  There are no acute osseous abnormalities or destructive bone lesions. CT Abdomen and Pelvis: Aorta appears unremarkable. No evidence of dissection. Mild atherosclerotic calcifications present. The origins of the mesenteric vasculature and  the renal vasculature appears patent. No evidence of thrombus. No evidence of hemodynamically significant stenosis. No evidence of pneumatosis. The liver appears normal in size without focal abnormality. Spleen is normal size and appears homogeneous.  Stomach is nondistended.  No adrenal mass.  Kidneys appear unremarkable.  No hydronephrosis.  Urinary bladder is within normal limits.  There is no bowel distention.  No pneumatosis intestinalis, pneumoperitoneum or lymphadenopathy.  No significant ascites.  The appendix appears within normal limits.  Gallbladder is nondistended.  Biliary tree is nondistended.  The pancreas appears homogeneous.  Abdominal vasculature is within normal limits. Subcutaneous fat and underlying musculature appear within normal limits.  There are no acute osseous abnormalities or destructive bone lesions.     Impression: 1.No acute cardiopulmonary process. No acute intra-abdominal or intrapelvic process. No evidence of aortic dissection. 2.Ancillary findings as described above. Electronically Signed: Delores Brewer MD  2/8/2025 1:34 AM EST  Workstation ID: [id number]    XR Chest 1 View    Result Date: 2/7/2025  XR CHEST 1 VW Date of Exam: 2/7/2025 10:06 PM EST Indication: Chest Pain Triage Protocol Comparison: Chest x-ray 1/2/2025 Findings: Cardiomegaly. Postoperative change of the heart. Lungs normally expanded. No pneumothorax or pleural effusion or focal pulmonary parenchymal opacity.     Impression: Cardiomegaly with postoperative changes of the heart. No acute cardiopulmonary abnormality. Electronically Signed: Grecia Espinosa MD  2/7/2025 10:09 PM EST  Workstation ID: [id number]             Results for orders placed during the hospital encounter of 02/07/25    Adult Transthoracic Echo Complete With Contrast if Necessary Per Protocol    Interpretation Summary    Left ventricular ejection fraction appears to be 51 - 55%.    The right ventricular cavity is mildly dilated.    Estimated right  ventricular systolic pressure from tricuspid regurgitation is normal (<35 mmHg).    Normal left atrial size and volume noted.      Discharge Details        Discharge Medications        New Medications        Instructions Start Date   spironolactone 25 MG tablet  Commonly known as: ALDACTONE   25 mg, Oral, Daily             Continue These Medications        Instructions Start Date   amLODIPine 10 MG tablet  Commonly known as: NORVASC   10 mg, Daily      aspirin 81 MG EC tablet   81 mg, Daily      atorvastatin 80 MG tablet  Commonly known as: LIPITOR   80 mg, Daily      buPROPion  MG 12 hr tablet  Commonly known as: WELLBUTRIN SR   150 mg, Oral, 2 Times Daily      clopidogrel 75 MG tablet  Commonly known as: PLAVIX   75 mg, Daily      Diclofenac Sodium 1 % gel gel  Commonly known as: VOLTAREN   4 g, Topical, 4 Times Daily PRN      empagliflozin 25 MG tablet tablet  Commonly known as: JARDIANCE   25 mg, Daily      ezetimibe 10 MG tablet  Commonly known as: ZETIA   Take 1 tablet by mouth Daily.      glimepiride 4 MG tablet  Commonly known as: AMARYL   4 mg, Daily      losartan 100 MG tablet  Commonly known as: COZAAR   100 mg, Nightly      metFORMIN  MG 24 hr tablet  Commonly known as: GLUCOPHAGE-XR   1,000 mg, 2 Times Daily Before Meals      metoprolol succinate XL 50 MG 24 hr tablet  Commonly known as: TOPROL-XL   50 mg, Daily      Semaglutide (2 MG/DOSE) 8 MG/3ML solution pen-injector  Commonly known as: OZEMPIC   2 mg, Subcutaneous, Weekly, Patient takes on Fridays               No Known Allergies      Discharge Disposition:  Home or Self Care    Diet:  Hospital:No active diet order           CODE STATUS:    Code Status and Medical Interventions: CPR (Attempt to Resuscitate); Full Support   Ordered at: 02/08/25 0252     Code Status (Patient has no pulse and is not breathing):    CPR (Attempt to Resuscitate)     Medical Interventions (Patient has pulse or is breathing):    Full Support       No future  appointments.    Additional Instructions for the Follow-ups that You Need to Schedule       Discharge Follow-up with PCP   As directed       Currently Documented PCP:    Aliya Lee, NP    PCP Phone Number:    [phone]     Follow Up Details: 1-2 weeks        Discharge Follow-up with Specified Provider: Cardiology 1-2 months   As directed      To: Cardiology 1-2 months                      Chaka Nunes DO  02/09/25      Time Spent on Discharge:  I spent  25  minutes on this discharge activity which included: face-to-face encounter with the patient, reviewing the data in the system, coordination of the care with the nursing staff as well as consultants, documentation, and entering orders.

## 2025-02-10 ENCOUNTER — DOCUMENTATION (OUTPATIENT)
Dept: CARDIAC REHAB | Facility: HOSPITAL | Age: 50
End: 2025-02-10
Payer: MEDICAID

## 2025-02-14 LAB
QT INTERVAL: 360 MS
QT INTERVAL: 412 MS
QT INTERVAL: 422 MS
QTC INTERVAL: 396 MS
QTC INTERVAL: 397 MS
QTC INTERVAL: 410 MS

## 2025-06-23 ENCOUNTER — APPOINTMENT (OUTPATIENT)
Dept: CT IMAGING | Facility: HOSPITAL | Age: 50
DRG: 392 | End: 2025-06-23
Payer: MEDICARE

## 2025-06-23 ENCOUNTER — APPOINTMENT (OUTPATIENT)
Dept: GENERAL RADIOLOGY | Facility: HOSPITAL | Age: 50
DRG: 392 | End: 2025-06-23
Payer: MEDICARE

## 2025-06-23 ENCOUNTER — HOSPITAL ENCOUNTER (INPATIENT)
Facility: HOSPITAL | Age: 50
LOS: 2 days | Discharge: HOME | DRG: 392 | End: 2025-06-26
Attending: EMERGENCY MEDICINE | Admitting: INTERNAL MEDICINE
Payer: MEDICARE

## 2025-06-23 DIAGNOSIS — N17.9 ACUTE KIDNEY INJURY: Primary | ICD-10-CM

## 2025-06-23 DIAGNOSIS — Z86.39 HISTORY OF DIABETES MELLITUS: ICD-10-CM

## 2025-06-23 DIAGNOSIS — Z86.79 HISTORY OF HYPERTENSION: ICD-10-CM

## 2025-06-23 LAB
ALBUMIN SERPL-MCNC: 4.4 G/DL (ref 3.5–5.2)
ALBUMIN/GLOB SERPL: 1.5 G/DL
ALP SERPL-CCNC: 94 U/L (ref 39–117)
ALT SERPL W P-5'-P-CCNC: 23 U/L (ref 1–41)
ANION GAP SERPL CALCULATED.3IONS-SCNC: 10 MMOL/L (ref 5–15)
AST SERPL-CCNC: 21 U/L (ref 1–40)
BACTERIA UR QL AUTO: ABNORMAL /HPF
BASOPHILS # BLD AUTO: 0.02 10*3/MM3 (ref 0–0.2)
BASOPHILS NFR BLD AUTO: 0.3 % (ref 0–1.5)
BILIRUB SERPL-MCNC: 0.7 MG/DL (ref 0–1.2)
BILIRUB UR QL STRIP: NEGATIVE
BUN SERPL-MCNC: 23.3 MG/DL (ref 6–20)
BUN/CREAT SERPL: 8.6 (ref 7–25)
CALCIUM SPEC-SCNC: 8.9 MG/DL (ref 8.6–10.5)
CHLORIDE SERPL-SCNC: 101 MMOL/L (ref 98–107)
CLARITY UR: CLEAR
CO2 SERPL-SCNC: 26 MMOL/L (ref 22–29)
COLOR UR: YELLOW
CREAT SERPL-MCNC: 2.7 MG/DL (ref 0.76–1.27)
D-LACTATE SERPL-SCNC: 1.8 MMOL/L (ref 0.5–2)
DEPRECATED RDW RBC AUTO: 44.6 FL (ref 37–54)
EGFRCR SERPLBLD CKD-EPI 2021: 27.7 ML/MIN/1.73
EOSINOPHIL # BLD AUTO: 0.15 10*3/MM3 (ref 0–0.4)
EOSINOPHIL NFR BLD AUTO: 2.3 % (ref 0.3–6.2)
ERYTHROCYTE [DISTWIDTH] IN BLOOD BY AUTOMATED COUNT: 13.2 % (ref 12.3–15.4)
GLOBULIN UR ELPH-MCNC: 2.9 GM/DL
GLUCOSE SERPL-MCNC: 127 MG/DL (ref 65–99)
GLUCOSE UR STRIP-MCNC: NEGATIVE MG/DL
HCT VFR BLD AUTO: 36 % (ref 37.5–51)
HGB BLD-MCNC: 11.2 G/DL (ref 13–17.7)
HGB UR QL STRIP.AUTO: ABNORMAL
HOLD SPECIMEN: NORMAL
HYALINE CASTS UR QL AUTO: ABNORMAL /LPF
IMM GRANULOCYTES # BLD AUTO: 0.01 10*3/MM3 (ref 0–0.05)
IMM GRANULOCYTES NFR BLD AUTO: 0.2 % (ref 0–0.5)
KETONES UR QL STRIP: ABNORMAL
LEUKOCYTE ESTERASE UR QL STRIP.AUTO: NEGATIVE
LYMPHOCYTES # BLD AUTO: 1.24 10*3/MM3 (ref 0.7–3.1)
LYMPHOCYTES NFR BLD AUTO: 18.8 % (ref 19.6–45.3)
MAGNESIUM SERPL-MCNC: 2 MG/DL (ref 1.6–2.6)
MCH RBC QN AUTO: 29 PG (ref 26.6–33)
MCHC RBC AUTO-ENTMCNC: 31.1 G/DL (ref 31.5–35.7)
MCV RBC AUTO: 93.3 FL (ref 79–97)
MONOCYTES # BLD AUTO: 0.61 10*3/MM3 (ref 0.1–0.9)
MONOCYTES NFR BLD AUTO: 9.2 % (ref 5–12)
NEUTROPHILS NFR BLD AUTO: 4.58 10*3/MM3 (ref 1.7–7)
NEUTROPHILS NFR BLD AUTO: 69.2 % (ref 42.7–76)
NITRITE UR QL STRIP: NEGATIVE
NRBC BLD AUTO-RTO: 0 /100 WBC (ref 0–0.2)
PH UR STRIP.AUTO: 5.5 [PH] (ref 5–8)
PLATELET # BLD AUTO: 282 10*3/MM3 (ref 140–450)
PMV BLD AUTO: 10.3 FL (ref 6–12)
POTASSIUM SERPL-SCNC: 4.6 MMOL/L (ref 3.5–5.2)
PROT SERPL-MCNC: 7.3 G/DL (ref 6–8.5)
PROT UR QL STRIP: ABNORMAL
RBC # BLD AUTO: 3.86 10*6/MM3 (ref 4.14–5.8)
RBC # UR STRIP: ABNORMAL /HPF
REF LAB TEST METHOD: ABNORMAL
SODIUM SERPL-SCNC: 137 MMOL/L (ref 136–145)
SP GR UR STRIP: 1.01 (ref 1–1.03)
SQUAMOUS #/AREA URNS HPF: ABNORMAL /HPF
TROPONIN T SERPL HS-MCNC: 52 NG/L
UROBILINOGEN UR QL STRIP: ABNORMAL
WBC # UR STRIP: ABNORMAL /HPF
WBC NRBC COR # BLD AUTO: 6.61 10*3/MM3 (ref 3.4–10.8)
WHOLE BLOOD HOLD COAG: NORMAL
WHOLE BLOOD HOLD SPECIMEN: NORMAL

## 2025-06-23 PROCEDURE — 25810000003 SODIUM CHLORIDE 0.9 % SOLUTION: Performed by: PHYSICIAN ASSISTANT

## 2025-06-23 PROCEDURE — 71045 X-RAY EXAM CHEST 1 VIEW: CPT

## 2025-06-23 PROCEDURE — 99285 EMERGENCY DEPT VISIT HI MDM: CPT

## 2025-06-23 PROCEDURE — 80053 COMPREHEN METABOLIC PANEL: CPT | Performed by: EMERGENCY MEDICINE

## 2025-06-23 PROCEDURE — 74176 CT ABD & PELVIS W/O CONTRAST: CPT

## 2025-06-23 PROCEDURE — 83735 ASSAY OF MAGNESIUM: CPT | Performed by: EMERGENCY MEDICINE

## 2025-06-23 PROCEDURE — 93005 ELECTROCARDIOGRAM TRACING: CPT | Performed by: PHYSICIAN ASSISTANT

## 2025-06-23 PROCEDURE — 25010000002 HYDROMORPHONE 1 MG/ML SOLUTION: Performed by: EMERGENCY MEDICINE

## 2025-06-23 PROCEDURE — 83605 ASSAY OF LACTIC ACID: CPT | Performed by: PHYSICIAN ASSISTANT

## 2025-06-23 PROCEDURE — 81001 URINALYSIS AUTO W/SCOPE: CPT | Performed by: EMERGENCY MEDICINE

## 2025-06-23 PROCEDURE — 85025 COMPLETE CBC W/AUTO DIFF WBC: CPT | Performed by: EMERGENCY MEDICINE

## 2025-06-23 PROCEDURE — 25010000002 ONDANSETRON PER 1 MG: Performed by: PHYSICIAN ASSISTANT

## 2025-06-23 PROCEDURE — 84484 ASSAY OF TROPONIN QUANT: CPT | Performed by: EMERGENCY MEDICINE

## 2025-06-23 RX ORDER — ONDANSETRON 2 MG/ML
4 INJECTION INTRAMUSCULAR; INTRAVENOUS ONCE
Status: COMPLETED | OUTPATIENT
Start: 2025-06-23 | End: 2025-06-23

## 2025-06-23 RX ORDER — SODIUM CHLORIDE 0.9 % (FLUSH) 0.9 %
10 SYRINGE (ML) INJECTION AS NEEDED
Status: DISCONTINUED | OUTPATIENT
Start: 2025-06-23 | End: 2025-06-26 | Stop reason: HOSPADM

## 2025-06-23 RX ADMIN — SODIUM CHLORIDE 1000 ML: 9 INJECTION, SOLUTION INTRAVENOUS at 21:47

## 2025-06-23 RX ADMIN — HYDROMORPHONE HYDROCHLORIDE 1 MG: 1 INJECTION, SOLUTION INTRAMUSCULAR; INTRAVENOUS; SUBCUTANEOUS at 21:55

## 2025-06-23 RX ADMIN — ONDANSETRON 4 MG: 2 INJECTION INTRAMUSCULAR; INTRAVENOUS at 21:48

## 2025-06-23 NOTE — Clinical Note
Level of Care: Telemetry [5]   Diagnosis: Elevated serum creatinine [406447]   Admitting Physician: MIGUEL CANO III [[id number]]   Attending Physician: MIGUEL CANO III [[id number]]   Bed Request Comments: tele obs

## 2025-06-24 ENCOUNTER — APPOINTMENT (OUTPATIENT)
Dept: ULTRASOUND IMAGING | Facility: HOSPITAL | Age: 50
DRG: 392 | End: 2025-06-24
Payer: MEDICARE

## 2025-06-24 PROBLEM — I10 HTN (HYPERTENSION): Status: ACTIVE | Noted: 2025-06-24

## 2025-06-24 PROBLEM — R79.89 ELEVATED SERUM CREATININE: Status: ACTIVE | Noted: 2025-06-24

## 2025-06-24 PROBLEM — R10.9 ABDOMINAL PAIN: Status: ACTIVE | Noted: 2025-06-24

## 2025-06-24 PROBLEM — N17.9 ACUTE RENAL FAILURE: Status: ACTIVE | Noted: 2025-06-24

## 2025-06-24 LAB
ALBUMIN SERPL-MCNC: 4 G/DL (ref 3.5–5.2)
ALBUMIN/GLOB SERPL: 1.3 G/DL
ALP SERPL-CCNC: 82 U/L (ref 39–117)
ALT SERPL W P-5'-P-CCNC: 17 U/L (ref 1–41)
ANION GAP SERPL CALCULATED.3IONS-SCNC: 10 MMOL/L (ref 5–15)
AST SERPL-CCNC: 29 U/L (ref 1–40)
BASOPHILS # BLD AUTO: 0.02 10*3/MM3 (ref 0–0.2)
BASOPHILS NFR BLD AUTO: 0.3 % (ref 0–1.5)
BILIRUB SERPL-MCNC: 0.6 MG/DL (ref 0–1.2)
BUN SERPL-MCNC: 23.5 MG/DL (ref 6–20)
BUN/CREAT SERPL: 7.7 (ref 7–25)
CALCIUM SPEC-SCNC: 8.2 MG/DL (ref 8.6–10.5)
CHLORIDE SERPL-SCNC: 106 MMOL/L (ref 98–107)
CO2 SERPL-SCNC: 23 MMOL/L (ref 22–29)
CREAT SERPL-MCNC: 3.04 MG/DL (ref 0.76–1.27)
CREAT UR-MCNC: 59.2 MG/DL
DEPRECATED RDW RBC AUTO: 44.8 FL (ref 37–54)
EGFRCR SERPLBLD CKD-EPI 2021: 24 ML/MIN/1.73
EOSINOPHIL # BLD AUTO: 0.22 10*3/MM3 (ref 0–0.4)
EOSINOPHIL NFR BLD AUTO: 3.3 % (ref 0.3–6.2)
ERYTHROCYTE [DISTWIDTH] IN BLOOD BY AUTOMATED COUNT: 13.1 % (ref 12.3–15.4)
GLOBULIN UR ELPH-MCNC: 3.1 GM/DL
GLUCOSE BLDC GLUCOMTR-MCNC: 101 MG/DL (ref 70–130)
GLUCOSE BLDC GLUCOMTR-MCNC: 134 MG/DL (ref 70–130)
GLUCOSE BLDC GLUCOMTR-MCNC: 97 MG/DL (ref 70–130)
GLUCOSE BLDC GLUCOMTR-MCNC: 97 MG/DL (ref 70–130)
GLUCOSE SERPL-MCNC: 96 MG/DL (ref 65–99)
HCT VFR BLD AUTO: 31.6 % (ref 37.5–51)
HGB BLD-MCNC: 10 G/DL (ref 13–17.7)
IMM GRANULOCYTES # BLD AUTO: 0.01 10*3/MM3 (ref 0–0.05)
IMM GRANULOCYTES NFR BLD AUTO: 0.2 % (ref 0–0.5)
LYMPHOCYTES # BLD AUTO: 1.23 10*3/MM3 (ref 0.7–3.1)
LYMPHOCYTES NFR BLD AUTO: 18.5 % (ref 19.6–45.3)
MAGNESIUM SERPL-MCNC: 1.7 MG/DL (ref 1.6–2.6)
MCH RBC QN AUTO: 29.7 PG (ref 26.6–33)
MCHC RBC AUTO-ENTMCNC: 31.6 G/DL (ref 31.5–35.7)
MCV RBC AUTO: 93.8 FL (ref 79–97)
MONOCYTES # BLD AUTO: 0.76 10*3/MM3 (ref 0.1–0.9)
MONOCYTES NFR BLD AUTO: 11.4 % (ref 5–12)
NEUTROPHILS NFR BLD AUTO: 4.4 10*3/MM3 (ref 1.7–7)
NEUTROPHILS NFR BLD AUTO: 66.3 % (ref 42.7–76)
NRBC BLD AUTO-RTO: 0 /100 WBC (ref 0–0.2)
PHOSPHATE SERPL-MCNC: 4.6 MG/DL (ref 2.5–4.5)
PLATELET # BLD AUTO: 227 10*3/MM3 (ref 140–450)
PMV BLD AUTO: 9.9 FL (ref 6–12)
POTASSIUM SERPL-SCNC: 4.3 MMOL/L (ref 3.5–5.2)
PROT SERPL-MCNC: 7.1 G/DL (ref 6–8.5)
RBC # BLD AUTO: 3.37 10*6/MM3 (ref 4.14–5.8)
SODIUM SERPL-SCNC: 139 MMOL/L (ref 136–145)
SODIUM UR-SCNC: 40 MMOL/L
UUN 24H UR-MCNC: 118 MG/DL
WBC NRBC COR # BLD AUTO: 6.64 10*3/MM3 (ref 3.4–10.8)

## 2025-06-24 PROCEDURE — 25810000003 SODIUM CHLORIDE 0.9 % SOLUTION: Performed by: PHYSICIAN ASSISTANT

## 2025-06-24 PROCEDURE — 25810000003 SODIUM CHLORIDE 0.9 % SOLUTION: Performed by: NURSE PRACTITIONER

## 2025-06-24 PROCEDURE — 25010000002 MORPHINE PER 10 MG: Performed by: INTERNAL MEDICINE

## 2025-06-24 PROCEDURE — 80053 COMPREHEN METABOLIC PANEL: CPT | Performed by: NURSE PRACTITIONER

## 2025-06-24 PROCEDURE — 82570 ASSAY OF URINE CREATININE: CPT | Performed by: NURSE PRACTITIONER

## 2025-06-24 PROCEDURE — 85025 COMPLETE CBC W/AUTO DIFF WBC: CPT | Performed by: NURSE PRACTITIONER

## 2025-06-24 PROCEDURE — 76775 US EXAM ABDO BACK WALL LIM: CPT

## 2025-06-24 PROCEDURE — 82948 REAGENT STRIP/BLOOD GLUCOSE: CPT

## 2025-06-24 PROCEDURE — 83735 ASSAY OF MAGNESIUM: CPT | Performed by: NURSE PRACTITIONER

## 2025-06-24 PROCEDURE — 99222 1ST HOSP IP/OBS MODERATE 55: CPT | Performed by: NURSE PRACTITIONER

## 2025-06-24 PROCEDURE — 84540 ASSAY OF URINE/UREA-N: CPT | Performed by: NURSE PRACTITIONER

## 2025-06-24 PROCEDURE — 84300 ASSAY OF URINE SODIUM: CPT | Performed by: NURSE PRACTITIONER

## 2025-06-24 PROCEDURE — 84100 ASSAY OF PHOSPHORUS: CPT | Performed by: NURSE PRACTITIONER

## 2025-06-24 RX ORDER — ONDANSETRON 2 MG/ML
4 INJECTION INTRAMUSCULAR; INTRAVENOUS EVERY 6 HOURS PRN
Status: DISCONTINUED | OUTPATIENT
Start: 2025-06-24 | End: 2025-06-26 | Stop reason: HOSPADM

## 2025-06-24 RX ORDER — SODIUM CHLORIDE 9 MG/ML
40 INJECTION, SOLUTION INTRAVENOUS AS NEEDED
Status: DISCONTINUED | OUTPATIENT
Start: 2025-06-24 | End: 2025-06-26 | Stop reason: HOSPADM

## 2025-06-24 RX ORDER — INSULIN LISPRO 100 [IU]/ML
2-9 INJECTION, SOLUTION INTRAVENOUS; SUBCUTANEOUS
Status: DISCONTINUED | OUTPATIENT
Start: 2025-06-24 | End: 2025-06-26 | Stop reason: HOSPADM

## 2025-06-24 RX ORDER — NICOTINE POLACRILEX 4 MG
15 LOZENGE BUCCAL
Status: DISCONTINUED | OUTPATIENT
Start: 2025-06-24 | End: 2025-06-26 | Stop reason: HOSPADM

## 2025-06-24 RX ORDER — NICOTINE 21 MG/24HR
1 PATCH, TRANSDERMAL 24 HOURS TRANSDERMAL EVERY 24 HOURS
Status: DISCONTINUED | OUTPATIENT
Start: 2025-06-24 | End: 2025-06-26 | Stop reason: HOSPADM

## 2025-06-24 RX ORDER — POLYETHYLENE GLYCOL 3350 17 G/17G
17 POWDER, FOR SOLUTION ORAL DAILY PRN
Status: DISCONTINUED | OUTPATIENT
Start: 2025-06-24 | End: 2025-06-26 | Stop reason: HOSPADM

## 2025-06-24 RX ORDER — OXYCODONE HYDROCHLORIDE 5 MG/1
5 TABLET ORAL EVERY 6 HOURS PRN
Refills: 0 | Status: DISCONTINUED | OUTPATIENT
Start: 2025-06-24 | End: 2025-06-26 | Stop reason: HOSPADM

## 2025-06-24 RX ORDER — DEXTROSE MONOHYDRATE 25 G/50ML
25 INJECTION, SOLUTION INTRAVENOUS
Status: DISCONTINUED | OUTPATIENT
Start: 2025-06-24 | End: 2025-06-26 | Stop reason: HOSPADM

## 2025-06-24 RX ORDER — ASPIRIN 81 MG/1
81 TABLET ORAL DAILY
Status: DISCONTINUED | OUTPATIENT
Start: 2025-06-24 | End: 2025-06-26 | Stop reason: HOSPADM

## 2025-06-24 RX ORDER — SODIUM CHLORIDE 9 MG/ML
100 INJECTION, SOLUTION INTRAVENOUS CONTINUOUS
Status: ACTIVE | OUTPATIENT
Start: 2025-06-24 | End: 2025-06-24

## 2025-06-24 RX ORDER — SODIUM CHLORIDE 0.9 % (FLUSH) 0.9 %
10 SYRINGE (ML) INJECTION AS NEEDED
Status: DISCONTINUED | OUTPATIENT
Start: 2025-06-24 | End: 2025-06-26 | Stop reason: HOSPADM

## 2025-06-24 RX ORDER — METOPROLOL SUCCINATE 50 MG/1
50 TABLET, EXTENDED RELEASE ORAL DAILY
Status: DISCONTINUED | OUTPATIENT
Start: 2025-06-24 | End: 2025-06-26 | Stop reason: HOSPADM

## 2025-06-24 RX ORDER — MORPHINE SULFATE 2 MG/ML
2 INJECTION, SOLUTION INTRAMUSCULAR; INTRAVENOUS EVERY 4 HOURS PRN
Status: DISPENSED | OUTPATIENT
Start: 2025-06-24 | End: 2025-06-25

## 2025-06-24 RX ORDER — BUPROPION HYDROCHLORIDE 150 MG/1
150 TABLET, EXTENDED RELEASE ORAL 2 TIMES DAILY
Status: DISCONTINUED | OUTPATIENT
Start: 2025-06-24 | End: 2025-06-24

## 2025-06-24 RX ORDER — SODIUM CHLORIDE 0.9 % (FLUSH) 0.9 %
10 SYRINGE (ML) INJECTION EVERY 12 HOURS SCHEDULED
Status: DISCONTINUED | OUTPATIENT
Start: 2025-06-24 | End: 2025-06-26 | Stop reason: HOSPADM

## 2025-06-24 RX ORDER — IBUPROFEN 600 MG/1
1 TABLET ORAL
Status: DISCONTINUED | OUTPATIENT
Start: 2025-06-24 | End: 2025-06-26 | Stop reason: HOSPADM

## 2025-06-24 RX ORDER — SODIUM CHLORIDE 9 MG/ML
100 INJECTION, SOLUTION INTRAVENOUS CONTINUOUS
Status: ACTIVE | OUTPATIENT
Start: 2025-06-24 | End: 2025-06-25

## 2025-06-24 RX ORDER — ACETAMINOPHEN 325 MG/1
650 TABLET ORAL EVERY 6 HOURS PRN
Status: DISCONTINUED | OUTPATIENT
Start: 2025-06-24 | End: 2025-06-26 | Stop reason: HOSPADM

## 2025-06-24 RX ORDER — BISACODYL 10 MG
10 SUPPOSITORY, RECTAL RECTAL DAILY PRN
Status: DISCONTINUED | OUTPATIENT
Start: 2025-06-24 | End: 2025-06-26 | Stop reason: HOSPADM

## 2025-06-24 RX ORDER — BISACODYL 5 MG/1
5 TABLET, DELAYED RELEASE ORAL DAILY PRN
Status: DISCONTINUED | OUTPATIENT
Start: 2025-06-24 | End: 2025-06-26 | Stop reason: HOSPADM

## 2025-06-24 RX ORDER — ATORVASTATIN CALCIUM 40 MG/1
80 TABLET, FILM COATED ORAL DAILY
Status: DISCONTINUED | OUTPATIENT
Start: 2025-06-24 | End: 2025-06-26 | Stop reason: HOSPADM

## 2025-06-24 RX ORDER — AMOXICILLIN 250 MG
2 CAPSULE ORAL 2 TIMES DAILY PRN
Status: DISCONTINUED | OUTPATIENT
Start: 2025-06-24 | End: 2025-06-26 | Stop reason: HOSPADM

## 2025-06-24 RX ORDER — AMLODIPINE BESYLATE 5 MG/1
10 TABLET ORAL DAILY
Status: DISCONTINUED | OUTPATIENT
Start: 2025-06-24 | End: 2025-06-26 | Stop reason: HOSPADM

## 2025-06-24 RX ORDER — NITROGLYCERIN 0.4 MG/1
0.4 TABLET SUBLINGUAL
Status: DISCONTINUED | OUTPATIENT
Start: 2025-06-24 | End: 2025-06-26 | Stop reason: HOSPADM

## 2025-06-24 RX ORDER — SERTRALINE HYDROCHLORIDE 25 MG/1
25 TABLET, FILM COATED ORAL DAILY
Status: DISCONTINUED | OUTPATIENT
Start: 2025-06-24 | End: 2025-06-26 | Stop reason: HOSPADM

## 2025-06-24 RX ADMIN — AMLODIPINE BESYLATE 10 MG: 5 TABLET ORAL at 09:21

## 2025-06-24 RX ADMIN — OXYCODONE 5 MG: 5 TABLET ORAL at 15:15

## 2025-06-24 RX ADMIN — SODIUM CHLORIDE 1000 ML: 9 INJECTION, SOLUTION INTRAVENOUS at 01:04

## 2025-06-24 RX ADMIN — MORPHINE SULFATE 2 MG: 2 INJECTION, SOLUTION INTRAMUSCULAR; INTRAVENOUS at 21:53

## 2025-06-24 RX ADMIN — MORPHINE SULFATE 2 MG: 2 INJECTION, SOLUTION INTRAMUSCULAR; INTRAVENOUS at 06:24

## 2025-06-24 RX ADMIN — DOCUSATE SODIUM 50 MG AND SENNOSIDES 8.6 MG 2 TABLET: 8.6; 5 TABLET, FILM COATED ORAL at 09:27

## 2025-06-24 RX ADMIN — Medication 10 ML: at 09:24

## 2025-06-24 RX ADMIN — SODIUM CHLORIDE 100 ML/HR: 9 INJECTION, SOLUTION INTRAVENOUS at 13:20

## 2025-06-24 RX ADMIN — SODIUM CHLORIDE 100 ML/HR: 9 INJECTION, SOLUTION INTRAVENOUS at 03:07

## 2025-06-24 RX ADMIN — METOPROLOL SUCCINATE 50 MG: 50 TABLET, EXTENDED RELEASE ORAL at 09:21

## 2025-06-24 RX ADMIN — SERTRALINE 25 MG: 25 TABLET, FILM COATED ORAL at 10:22

## 2025-06-24 RX ADMIN — ATORVASTATIN CALCIUM 80 MG: 40 TABLET, FILM COATED ORAL at 09:21

## 2025-06-24 RX ADMIN — ASPIRIN 81 MG: 81 TABLET, COATED ORAL at 09:21

## 2025-06-24 RX ADMIN — MORPHINE SULFATE 2 MG: 2 INJECTION, SOLUTION INTRAMUSCULAR; INTRAVENOUS at 02:45

## 2025-06-24 NOTE — PROGRESS NOTES
Discharge Planning Assessment  [facility]     Patient Name: Aureliano Blake  MRN: [id number]  Today's Date: 6/24/2025    Admit Date: 6/23/2025        Discharge Needs Assessment    No documentation.                  Discharge Plan       Row Name 06/24/25 1430       Plan    Plan Comments Mr. Blake lives in [place] and his insurance is Medicare and [employer] [place]. His provider is Aliya Lee. His goal is to return home at discharge.                    Expected Discharge Date and Time       Expected Discharge Date Expected Discharge Time    Jun 24, 2025            Demographic Summary    No documentation.                  Functional Status    No documentation.                  Psychosocial    No documentation.                  Abuse/Neglect    No documentation.                  Legal    No documentation.                  Substance Abuse    No documentation.                  Patient Forms    No documentation.                     CORETTA Francisco

## 2025-06-24 NOTE — ED NOTES
Aureliano Blake    Nursing Report ED to Floor:  Mental status: a&ox4  Ambulatory status: upxsb  Oxygen Therapy:  ra  Cardiac Rhythm: nsr  Admitted from: home   Safety Concerns:  knee replacement 3 wks ago  Precautions: none  Social Issues: none  ED Room #:  11    ED Nurse Phone Extension - [phone] or may call [phone].      HPI:   Chief Complaint   Patient presents with    Abdominal Pain    Dizziness       Past Medical History:  Past Medical History:   Diagnosis Date    Diabetes mellitus     Heart disease     Hypertension         Past Surgical History:  Past Surgical History:   Procedure Laterality Date    CARDIAC SURGERY      KNEE ARTHROSCOPY          Admitting Doctor:   Bill Rao III, DO    Consulting Provider(s):  Consults       No orders found for last 30 day(s).             Admitting Diagnosis:   The primary encounter diagnosis was Acute kidney injury. Diagnoses of History of diabetes mellitus and History of hypertension were also pertinent to this visit.    Most Recent Vitals:   Vitals:    06/23/25 2330 06/24/25 0000 06/24/25 0030 06/24/25 0100   BP: 132/90 151/84 147/97 144/96   Pulse: 72 80 79 80   Resp:       Temp:       SpO2: 93% 98% 97% 94%   Weight:       Height:           Active LDAs/IV Access:   Lines, Drains & Airways       Active LDAs       Name Placement date Placement time Site Days    Peripheral IV 06/23/25 1945 20 G Left Antecubital 06/23/25 1945  Antecubital  less than 1                    Labs (abnormal labs have a star):   Labs Reviewed   COMPREHENSIVE METABOLIC PANEL - Abnormal; Notable for the following components:       Result Value    Glucose 127 (*)     BUN 23.3 (*)     Creatinine 2.70 (*)     eGFR 27.7 (*)     All other components within normal limits    Narrative:     GFR Categories in Chronic Kidney Disease (CKD)              GFR Category          GFR (mL/min/1.73)    Interpretation  G1                    90 or greater        Normal or high (1)  G2                    60-89                 Mild decrease (1)  G3a                   45-59                Mild to moderate decrease  G3b                   30-44                Moderate to severe decrease  G4                    15-29                Severe decrease  G5                    14 or less           Kidney failure    (1)In the absence of evidence of kidney disease, neither GFR category G1 or G2 fulfill the criteria for CKD.    eGFR calculation 2021 CKD-EPI creatinine equation, which does not include race as a factor   TROPONIN - Abnormal; Notable for the following components:    HS Troponin T 52 (*)     All other components within normal limits    Narrative:     High Sensitive Troponin T Reference Range:  <14.0 ng/L- Negative Female for AMI  <22.0 ng/L- Negative Male for AMI  >=14 - Abnormal Female indicating possible myocardial injury.  >=22 - Abnormal Male indicating possible myocardial injury.   Clinicians would have to utilize clinical acumen, EKG, Troponin, and serial changes to determine if it is an Acute Myocardial Infarction or myocardial injury due to an underlying chronic condition.        URINALYSIS W/ MICROSCOPIC IF INDICATED (NO CULTURE) - Abnormal; Notable for the following components:    Ketones, UA Trace (*)     Blood, UA Trace (*)     Protein, UA 30 mg/dL (1+) (*)     All other components within normal limits   CBC WITH AUTO DIFFERENTIAL - Abnormal; Notable for the following components:    RBC 3.86 (*)     Hemoglobin 11.2 (*)     Hematocrit 36.0 (*)     MCHC 31.1 (*)     Lymphocyte % 18.8 (*)     All other components within normal limits   URINALYSIS, MICROSCOPIC ONLY - Abnormal; Notable for the following components:    WBC, UA 3-5 (*)     Squamous Epithelial Cells, UA 3-6 (*)     All other components within normal limits   MAGNESIUM - Normal   LACTIC ACID, PLASMA - Normal   RAINBOW DRAW    Narrative:     The following orders were created for panel order [place] Draw.  Procedure                               Abnormality          Status                     ---------                               -----------         ------                     Green Top (Gel)[[id number]]                                  Final result               Lavender Top[[id number]]                                     Final result               Gold Top - SST[[id number]]                                   Final result               Patiño Top[[id number]]                                         Final result               Light Blue Top[[id number]]                                   Final result                 Please view results for these tests on the individual orders.   CBC AND DIFFERENTIAL    Narrative:     The following orders were created for panel order CBC & Differential.  Procedure                               Abnormality         Status                     ---------                               -----------         ------                     CBC Auto Differential[[id number]]        Abnormal            Final result                 Please view results for these tests on the individual orders.   GREEN TOP   LAVENDER TOP   GOLD TOP - SST   GRAY TOP   LIGHT BLUE TOP       Meds Given in ED:   Medications   sodium chloride 0.9 % flush 10 mL (has no administration in time range)   sodium chloride 0.9 % bolus 1,000 mL (1,000 mL Intravenous New Bag 6/24/25 0104)   sodium chloride 0.9 % bolus 1,000 mL (0 mL Intravenous Stopped 6/23/25 2217)   ondansetron (ZOFRAN) injection 4 mg (4 mg Intravenous Given 6/23/25 2148)   HYDROmorphone (DILAUDID) injection 1 mg (1 mg Intravenous Given 6/23/25 2155)           Last NIH score:                                                          Dysphagia screening results:  Patient Factors Component (Dysphagia:Stroke or Rule-out)  Best Eye Response: 4-->(E4) spontaneous (06/23/25 2144)  Best Motor Response: 6-->(M6) obeys commands (06/23/25 2144)  Best Verbal Response: 5-->(V5) oriented (06/23/25 2144)  [place] Coma Scale Score: 15 (06/23/25 2144)      [place] Coma Scale:  No data recorded     CIWA:        Restraint Type:            Isolation Status:  No active isolations

## 2025-06-24 NOTE — H&P
[facility] Medicine Services  HISTORY AND PHYSICAL    Patient Name: Aureliano Blake  : 1975  MRN: [id number]  Primary Care Physician: Aliya Lee NP  Date of admission: 2025    Subjective   Subjective     Chief Complaint:  Abdominal pain    HPI:  Aureliano Blake is a 50 y.o. male with history of CAD-5 vessel CABG 3 years ago, depression anxiety, IBS, DM2 presents the ED tonight with 2 days of abdominal pain and cramping with nausea and vomiting.  Emesis x 4 Gy liquid, pain described as constant and cramping also in his low back, 8/10 severity at worst, 5/10 currently, mostly LLQ.  Patient has not had a regular bowel movement in about a week but yesterday admits to multiple loose stools.  Took MiraLAX yesterday.  Only new meds patient is on his stool softener and pain meds related to his recent right total knee replacement. Other associated symptoms include intermittent dizziness and mild sore throat from throwing up.  Nothing has made symptoms better or worse.  On arrival to the ED labs are drawn and creatinine is 2.7 with baseline 0.93 in February.  Patient denies any kidney disease in history.  He did have a heart cath where he received some contrast the wk before knee surgery.    ROS also positive for swelling to R knee r/t surgery, otherwise negative.    Review of Systems   Constitutional:  Negative for chills, diaphoresis and fever.   HENT:  Positive for sore throat. Negative for congestion, ear discharge, ear pain, rhinorrhea, sinus pain and trouble swallowing.    Eyes:  Negative for pain, discharge, redness and itching.   Respiratory:  Negative for cough, shortness of breath and wheezing.    Cardiovascular:  Positive for leg swelling. Negative for chest pain and palpitations.   Gastrointestinal:  Positive for abdominal pain, constipation, diarrhea, nausea and vomiting. Negative for abdominal distention and blood in stool.   Genitourinary:  Negative for decreased  urine volume, dysuria, frequency and hematuria.   Skin:  Negative for color change, rash and wound.   Neurological:  Positive for dizziness. Negative for seizures, numbness and headaches.                Personal History     Past Medical History:   Diagnosis Date    Diabetes mellitus     Heart disease     Hypertension              Past Surgical History:   Procedure Laterality Date    CARDIAC SURGERY      KNEE ARTHROSCOPY         Family History:  family history includes Diabetes in his mother; Heart disease in his father.     Social History:  reports that he has been smoking cigarettes. He started smoking about 31 years ago. He has a 31.1 pack-year smoking history. He has never used smokeless tobacco. He reports current alcohol use of about 2.0 standard drinks of alcohol per week. He reports that he does not use drugs.  Social History     Social History Narrative    Not on file       Medications:  Diclofenac Sodium, Semaglutide (2 MG/DOSE), amLODIPine, aspirin, atorvastatin, buPROPion SR, empagliflozin, ezetimibe, glimepiride, losartan, metFORMIN ER, metoprolol succinate XL, and spironolactone    No Known Allergies    Objective   Objective     Vital Signs:   Temp:  [97.9 °F (36.6 °C)] 97.9 °F (36.6 °C)  Heart Rate:  [72-89] 75  Resp:  [18] 18  BP: (116-151)/(78-97) 130/79    Physical Exam   Constitutional: Awake, alert  Eyes: PERRL, sclerae anicteric, no conjunctival injection  HENT: NCAT, mucous membranes moist  Neck: Supple, no thyromegaly, no lymphadenopathy, trachea midline  Respiratory: Clear to auscultation bilaterally, nonlabored respirations   Cardiovascular: RRR, no murmurs, rubs, or gallops, palpable pedal pulses bilaterally  Gastrointestinal: Positive bowel sounds, soft, nondistended, generalized ttp, mostly LLQ, neg murphys, neg rebound, neg obturator  Musculoskeletal: No bilateral ankle edema, no clubbing or cyanosis to extremities, R knee edema  Psychiatric: Appropriate affect, cooperative  Neurologic:  Oriented x 3, strength symmetric in all extremities, Cranial Nerves grossly intact to confrontation, speech clear  Skin: No rashes     Result Review:  I have personally reviewed the results from the time of this admission to 6/24/2025 01:25 EDT and agree with these findings:  [x]  Laboratory list / accordion  [x]  Microbiology  [x]  Radiology  [x]  EKG/Telemetry   []  Cardiology/Vascular   []  Pathology  [x]  Old records  []  Other:  Most notable findings include: elevated creatinine, proteinuria    LAB RESULTS:      Lab 06/23/25 1946   WBC 6.61   HEMOGLOBIN 11.2*   HEMATOCRIT 36.0*   PLATELETS 282   NEUTROS ABS 4.58   IMMATURE GRANS (ABS) 0.01   LYMPHS ABS 1.24   MONOS ABS 0.61   EOS ABS 0.15   MCV 93.3   LACTATE 1.8         Lab 06/23/25 1946   SODIUM 137   POTASSIUM 4.6   CHLORIDE 101   CO2 26.0   ANION GAP 10.0   BUN 23.3*   CREATININE 2.70*   EGFR 27.7*   GLUCOSE 127*   CALCIUM 8.9   MAGNESIUM 2.0         Lab 06/23/25 1946   TOTAL PROTEIN 7.3   ALBUMIN 4.4   GLOBULIN 2.9   ALT (SGPT) 23   AST (SGOT) 21   BILIRUBIN 0.7   ALK PHOS 94         Lab 06/23/25 1946   HSTROP T 52*                 Brief Urine Lab Results  (Last result in the past 365 days)        Color   Clarity   Blood   Leuk Est   Nitrite   Protein   CREAT   Urine HCG        06/23/25 1947 Yellow   Clear   Trace   Negative   Negative   30 mg/dL (1+)                 Microbiology Results (last 10 days)       ** No results found for the last 240 hours. **            CT Abdomen Pelvis Without Contrast  Result Date: 6/23/2025  CT ABDOMEN PELVIS WO CONTRAST Date of Exam: 6/23/2025 10:00 PM EDT Indication: LLQ abdominal pain. Comparison: CT angiogram of the abdomen and pelvis dated 2/8/2025 Technique: Axial CT images were obtained of the abdomen and pelvis without the administration of contrast. Reconstructed coronal and sagittal images were also obtained. Automated exposure control and iterative construction methods were used. Findings: The heart is mildly  enlarged. There is no pericardial effusion. Median sternotomy wires are present. The lung bases are clear. The liver is normal in size and contour. There is mild hepatic steatosis. There is no focal liver lesion by noncontrast technique. The gallbladder is present without wall thickening. There is no intrahepatic or extrahepatic biliary ductal dilatation. The spleen is normal in size. The adrenal glands and pancreas appear within normal limits. The kidneys are symmetric in size. There is no hydronephrosis or urolithiasis. The urinary bladder is collapsed which limits evaluation. The prostate is normal in size. The stomach and duodenum are normal in caliber and configuration. There are no abnormally dilated loops of small bowel to suggest small bowel obstruction. Stable gas-filled dilated appendix. There is no inflammatory stranding or wall thickening. There is  colonic diverticulosis without acute diverticulitis. There is no free fluid or free air. The aorta is normal in caliber without aneurysm formation. There is no abdominal or pelvic lymphadenopathy. There is degenerative disc disease of the lumbar spine with degenerative facet arthropathy.     Impression: Impression: 1.No acute intra-abdominal or pelvic abnormality. 2.Colonic diverticulosis without acute diverticulitis. 3.Mild hepatic steatosis. Electronically Signed: Kev Ray MD  6/23/2025 10:30 PM EDT  Workstation ID: [id number]    XR Chest 1 View  Result Date: 6/23/2025  XR CHEST 1 VW Date of Exam: 6/23/2025 8:20 PM EDT Indication: Weak/Dizzy/AMS triage protocol Comparison: CT chest 2/8/2025 Findings: Cardiomegaly. Prior median sternotomy and CABG. Negative for lobar consolidation, edema, effusion or pneumothorax. Osseous structures without acute abnormality.     Impression: Impression: No active pulmonary process. Electronically Signed: Miguel Fields MD  6/23/2025 8:33 PM EDT  Workstation ID: [id number]      Results for orders placed during the  hospital encounter of 02/07/25    Adult Transthoracic Echo Complete With Contrast if Necessary Per Protocol    Interpretation Summary    Left ventricular ejection fraction appears to be 51 - 55%.    The right ventricular cavity is mildly dilated.    Estimated right ventricular systolic pressure from tricuspid regurgitation is normal (<35 mmHg).    Normal left atrial size and volume noted.      Assessment & Plan   Assessment & Plan       Elevated serum creatinine    Cigarette smoker    Type 2 diabetes mellitus with diabetic neuropathy, without long-term current use of insulin    Abdominal pain    HTN (hypertension)    Aureliano Blake is a 50 y.o. male with history of CAD-5 vessel CABG 3 years ago, depression anxiety, IBS, DM2 presents the ED tonight with 2 days of abdominal pain and cramping with nausea and vomiting.  Found to have elevated creatinine.  Admitted to the hospitalist for further eval and treatment.      Elevated serum creatinine  Creat up to 2.7 from 0.93 in feb this yr  No new meds  Did have relatively recent heart cath  Recent emesis/diarrhea- possible prerenal  On metformin and losartan- will hold  Will check urine labs  Ivf  Will order renal us given his dm and early onset cad  Nephro consult      Cigarette smoker  Discussed cessation  1ppd current  Will give nicotine patch if needed      Type 2 diabetes mellitus with diabetic neuropathy, without long-term current use of insulin  On metformin, jardiance, glimepiride, semaglutide, hold home meds  A1c in feb 8.1, glucose current 127  Check A1c  ssi      Abdominal pain  Ct- colonic diverticulosis with no acute diverticulitis, no acute abnormalities  Zofran  Pain control  Clears and aat diet    HTN  On metoprolol, norvasc, losartan, spironolactone at home  Stable tonight  Hold arb and diuretic, continue others      Time spent with this patient including but not limited to assessment, lab/image interpretation, planning, education, discussion with  family, documentation:  55 minutes.    DVT prophylaxis:  scd    CODE STATUS:  full       Expected Discharge  Expected Discharge Date: 6/24/2025; Expected Discharge Time:       This note has been completed as part of a split-shared workflow.     Signature: Electronically signed by KING Brown, 06/24/25, 2:09 AM EDT    ------------------------------------    The patient was seen independently by the APC.  I was available for any questions or concerns.     Electronically signed by Bill Rao III, DO, 06/24/25, 2:55 AM EDT.

## 2025-06-24 NOTE — PLAN OF CARE
Problem: Adult Inpatient Plan of Care  Goal: Plan of Care Review  6/24/2025 1844 by Kristine Velazquez RN  Outcome: Progressing  Flowsheets (Taken 6/24/2025 1844)  Progress: improving  Plan of Care Reviewed With: patient  6/24/2025 1843 by Kristine Velazquez RN  Outcome: Progressing  Flowsheets (Taken 6/24/2025 1843)  Progress: improving  Plan of Care Reviewed With: patient  Goal: Patient-Specific Goal (Individualized)  6/24/2025 1844 by Kristine Velazquez RN  Outcome: Progressing  6/24/2025 1843 by Kristine Velazquez RN  Outcome: Progressing  Goal: Absence of Hospital-Acquired Illness or Injury  6/24/2025 1844 by Kristine Velazquez RN  Outcome: Progressing  6/24/2025 1843 by Kristine Velazquez RN  Outcome: Progressing  Intervention: Identify and Manage Fall Risk  Recent Flowsheet Documentation  Taken 6/24/2025 1820 by Kristine Velazquez RN  Safety Promotion/Fall Prevention:   activity supervised   clutter free environment maintained   assistive device/personal items within reach   fall prevention program maintained   toileting scheduled   safety round/check completed   room organization consistent   nonskid shoes/slippers when out of bed  Taken 6/24/2025 1600 by Kristine Velazquez RN  Safety Promotion/Fall Prevention:   activity supervised   clutter free environment maintained   assistive device/personal items within reach   fall prevention program maintained   toileting scheduled   safety round/check completed   room organization consistent   nonskid shoes/slippers when out of bed  Taken 6/24/2025 1400 by Kristine Velazquez RN  Safety Promotion/Fall Prevention:   activity supervised   clutter free environment maintained   assistive device/personal items within reach   fall prevention program maintained   toileting scheduled   safety round/check completed   room organization consistent   nonskid shoes/slippers when out of bed  Taken 6/24/2025 1200 by Kristine Velazquez RN  Safety Promotion/Fall Prevention:   activity supervised   clutter free  environment maintained   assistive device/personal items within reach   fall prevention program maintained   toileting scheduled   safety round/check completed   room organization consistent   nonskid shoes/slippers when out of bed  Taken 6/24/2025 1022 by Kristine Velazquez RN  Safety Promotion/Fall Prevention:   activity supervised   clutter free environment maintained   assistive device/personal items within reach   fall prevention program maintained   toileting scheduled   safety round/check completed   room organization consistent   nonskid shoes/slippers when out of bed  Taken 6/24/2025 0800 by Kristine Velazquez RN  Safety Promotion/Fall Prevention:   safety round/check completed   room organization consistent   fall prevention program maintained   clutter free environment maintained   assistive device/personal items within [facility]  Intervention: Prevent Skin Injury  Recent Flowsheet Documentation  Taken 6/24/2025 1820 by Kristine Velazquez RN  Body Position: position changed independently  Skin Protection:   incontinence pads utilized   protective footwear used   transparent dressing maintained  Taken 6/24/2025 1600 by Kristine Velazquez RN  Body Position: position changed independently  Skin Protection:   incontinence pads utilized   protective footwear used   transparent dressing maintained  Taken 6/24/2025 1400 by Kristine Velazquez RN  Body Position: position changed independently  Skin Protection:   incontinence pads utilized   protective footwear used   transparent dressing maintained  Taken 6/24/2025 1200 by Kristine Velazquez RN  Body Position: position changed independently  Skin Protection:   incontinence pads utilized   protective footwear used   transparent dressing maintained  Taken 6/24/2025 1022 by Kristine Velazquez RN  Body Position: position changed independently  Skin Protection:   incontinence pads utilized   protective footwear used   transparent dressing maintained  Taken 6/24/2025 0800 by Kristine Velazquez  RN  Body Position: position changed independently  Skin Protection:   protective footwear used   incontinence pads utilized   transparent dressing maintained  Intervention: Prevent Infection  Recent Flowsheet Documentation  Taken 6/24/2025 1820 by Kristine Velazquez RN  Infection Prevention:   environmental surveillance performed   hand hygiene promoted   rest/sleep promoted   single patient room provided  Taken 6/24/2025 1600 by Kristine Velazquez RN  Infection Prevention:   environmental surveillance performed   hand hygiene promoted   rest/sleep promoted   single patient room provided  Taken 6/24/2025 1400 by Kristine Velazquez RN  Infection Prevention:   environmental surveillance performed   hand hygiene promoted   rest/sleep promoted   single patient room provided  Taken 6/24/2025 1200 by Kristine Velazquez RN  Infection Prevention:   environmental surveillance performed   hand hygiene promoted   rest/sleep promoted   single patient room provided  Taken 6/24/2025 1022 by Kristine Velazquez RN  Infection Prevention:   environmental surveillance performed   hand hygiene promoted   rest/sleep promoted   single patient room provided  Taken 6/24/2025 0800 by Kristine Velazquez RN  Infection Prevention:   environmental surveillance performed   equipment surfaces disinfected   personal protective equipment utilized   single patient room provided   hand hygiene promoted  Goal: Optimal Comfort and Wellbeing  6/24/2025 1844 by Kristine Velazquez RN  Outcome: Progressing  6/24/2025 1843 by Kristine Velazquez RN  Outcome: Progressing  Goal: Readiness for Transition of Care  6/24/2025 1844 by Kristine Velazquez RN  Outcome: Progressing  6/24/2025 1843 by Kristine Velazquez RN  Outcome: Progressing     Problem: Pain Acute  Goal: Optimal Pain Control and Function  6/24/2025 1844 by Kristine Velazquez RN  Outcome: Progressing  6/24/2025 1843 by Kristine Velazquez RN  Outcome: Progressing  Intervention: Prevent or Manage Pain  Recent Flowsheet  Documentation  Taken 6/24/2025 0800 by Kristine Velazquez, RN  Medication Review/Management: medications reviewed   Goal Outcome Evaluation:  Plan of Care Reviewed With: patient        Progress: improving

## 2025-06-24 NOTE — ED PROVIDER NOTES
EMERGENCY DEPARTMENT ENCOUNTER    Pt Name: Aureliano Blake  MRN: [id number]  Pt :   1975  Room Number:  S516/1  Date of encounter:  2025  PCP: Aliya Lee NP  ED Provider: JORGE Marsh    Historian: Patient    HPI:  Chief Complaint: Vomiting and stomach cramping for two days    Context: Aureliano Blake is a 50 y.o. male who presents to the ED c/o a two-day history of vomiting and stomach cramping. He reports no bowel movement for about a week. The patient experiences dizziness and has had diarrhea without relief. He recently underwent knee surgery and has been taking a stool softener. Pain medication is used infrequently, about once a day. The patient denies fever. He locates the pain on the left side of his abdomen. Recently, he has been able to drink small amounts of Gatorade and water. The patient reports ongoing nausea and stomach pain.  HPI     REVIEW OF SYSTEMS  A chief complaint appropriate review of systems was completed and is negative except as noted in the HPI.     PAST MEDICAL HISTORY  Past Medical History:   Diagnosis Date    Diabetes mellitus     Heart disease     Hypertension        PAST SURGICAL HISTORY  Past Surgical History:   Procedure Laterality Date    CARDIAC SURGERY      KNEE ARTHROSCOPY         FAMILY HISTORY  Family History   Problem Relation Age of Onset    Diabetes Mother     Heart disease Father        SOCIAL HISTORY  Social History     Socioeconomic History    Marital status:    Tobacco Use    Smoking status: Some Days     Current packs/day: 1.00     Average packs/day: 1 pack/day for 31.1 years (31.1 ttl pk-yrs)     Types: Cigarettes     Start date: 1994    Smokeless tobacco: Never   Vaping Use    Vaping status: Never Used   Substance and Sexual Activity    Alcohol use: Yes     Alcohol/week: 2.0 standard drinks of alcohol     Types: 2 Shots of liquor per week     Comment: occasional    Drug use: No    Sexual activity: Defer       ALLERGIES  Patient  has no known allergies.    PHYSICAL EXAM  Physical Exam  Vitals and nursing note reviewed.   Constitutional:       General: He is not in acute distress.     Appearance: Normal appearance. He is not ill-appearing.   HENT:      Head: Normocephalic and atraumatic.      Nose: Nose normal.      Mouth/Throat:      Mouth: Mucous membranes are moist.   Eyes:      Extraocular Movements: Extraocular movements intact.   Cardiovascular:      Rate and Rhythm: Normal rate.   Pulmonary:      Effort: Pulmonary effort is normal.   Abdominal:      General: There is no distension.      Palpations: Abdomen is soft.      Tenderness: There is generalized abdominal tenderness. There is no guarding or rebound.   Musculoskeletal:         General: Normal range of motion.      Cervical back: Normal range of motion and neck supple.   Skin:     General: Skin is warm and dry.   Neurological:      General: No focal deficit present.      Mental Status: He is alert.   Psychiatric:         Mood and Affect: Mood normal.         Behavior: Behavior normal.       LAB RESULTS  Results for orders placed or performed during the hospital encounter of 06/23/25   Comprehensive Metabolic Panel    Collection Time: 06/23/25  7:46 PM    Specimen: Blood   Result Value Ref Range    Glucose 127 (H) 65 - 99 mg/dL    BUN 23.3 (H) 6.0 - 20.0 mg/dL    Creatinine 2.70 (H) 0.76 - 1.27 mg/dL    Sodium 137 136 - 145 mmol/L    Potassium 4.6 3.5 - 5.2 mmol/L    Chloride 101 98 - 107 mmol/L    CO2 26.0 22.0 - 29.0 mmol/L    Calcium 8.9 8.6 - 10.5 mg/dL    Total Protein 7.3 6.0 - 8.5 g/dL    Albumin 4.4 3.5 - 5.2 g/dL    ALT (SGPT) 23 1 - 41 U/L    AST (SGOT) 21 1 - 40 U/L    Alkaline Phosphatase 94 39 - 117 U/L    Total Bilirubin 0.7 0.0 - 1.2 mg/dL    Globulin 2.9 gm/dL    A/G Ratio 1.5 g/dL    BUN/Creatinine Ratio 8.6 7.0 - 25.0    Anion Gap 10.0 5.0 - 15.0 mmol/L    eGFR 27.7 (L) >60.0 mL/min/1.73   High Sensitivity Troponin T    Collection Time: 06/23/25  7:46 PM     Specimen: Blood   Result Value Ref Range    HS Troponin T 52 (H) <22 ng/L   Magnesium    Collection Time: 06/23/25  7:46 PM    Specimen: Blood   Result Value Ref Range    Magnesium 2.0 1.6 - 2.6 mg/dL   CBC Auto Differential    Collection Time: 06/23/25  7:46 PM    Specimen: Blood   Result Value Ref Range    WBC 6.61 3.40 - 10.80 10*3/mm3    RBC 3.86 (L) 4.14 - 5.80 10*6/mm3    Hemoglobin 11.2 (L) 13.0 - 17.7 g/dL    Hematocrit 36.0 (L) 37.5 - 51.0 %    MCV 93.3 79.0 - 97.0 fL    MCH 29.0 26.6 - 33.0 pg    MCHC 31.1 (L) 31.5 - 35.7 g/dL    RDW 13.2 12.3 - 15.4 %    RDW-SD 44.6 37.0 - 54.0 fl    MPV 10.3 6.0 - 12.0 fL    Platelets 282 140 - 450 10*3/mm3    Neutrophil % 69.2 42.7 - 76.0 %    Lymphocyte % 18.8 (L) 19.6 - 45.3 %    Monocyte % 9.2 5.0 - 12.0 %    Eosinophil % 2.3 0.3 - 6.2 %    Basophil % 0.3 0.0 - 1.5 %    Immature Grans % 0.2 0.0 - 0.5 %    Neutrophils, Absolute 4.58 1.70 - 7.00 10*3/mm3    Lymphocytes, Absolute 1.24 0.70 - 3.10 10*3/mm3    Monocytes, Absolute 0.61 0.10 - 0.90 10*3/mm3    Eosinophils, Absolute 0.15 0.00 - 0.40 10*3/mm3    Basophils, Absolute 0.02 0.00 - 0.20 10*3/mm3    Immature Grans, Absolute 0.01 0.00 - 0.05 10*3/mm3    nRBC 0.0 0.0 - 0.2 /100 WBC   Lactic Acid, Plasma    Collection Time: 06/23/25  7:46 PM    Specimen: Blood   Result Value Ref Range    Lactate 1.8 0.5 - 2.0 mmol/L   Green Top (Gel)    Collection Time: 06/23/25  7:46 PM   Result Value Ref Range    Extra Tube Hold for add-ons.    Lavender Top    Collection Time: 06/23/25  7:46 PM   Result Value Ref Range    Extra Tube hold for add-on    Gold Top - SST    Collection Time: 06/23/25  7:46 PM   Result Value Ref Range    Extra Tube Hold for add-ons.    Gray Top    Collection Time: 06/23/25  7:46 PM   Result Value Ref Range    Extra Tube Hold for add-ons.    Light Blue Top    Collection Time: 06/23/25  7:46 PM   Result Value Ref Range    Extra Tube Hold for add-ons.    Urinalysis With Microscopic If Indicated (No Culture) -  Urine, Clean Catch    Collection Time: 06/23/25  7:47 PM    Specimen: Urine, Clean Catch   Result Value Ref Range    Color, UA Yellow Yellow, Straw    Appearance, UA Clear Clear    pH, UA 5.5 5.0 - 8.0    Specific Gravity, UA 1.011 1.001 - 1.030    Glucose, UA Negative Negative    Ketones, UA Trace (A) Negative    Bilirubin, UA Negative Negative    Blood, UA Trace (A) Negative    Protein, UA 30 mg/dL (1+) (A) Negative    Leuk Esterase, UA Negative Negative    Nitrite, UA Negative Negative    Urobilinogen, UA 0.2 E.U./dL 0.2 - 1.0 E.U./dL   Urinalysis, Microscopic Only - Urine, Clean Catch    Collection Time: 06/23/25  7:47 PM    Specimen: Urine, Clean Catch   Result Value Ref Range    RBC, UA 0-2 None Seen, 0-2 /HPF    WBC, UA 3-5 (A) None Seen, 0-2 /HPF    Bacteria, UA Trace None Seen, Trace /HPF    Squamous Epithelial Cells, UA 3-6 (A) None Seen, 0-2 /HPF    Hyaline Casts, UA 0-6 0 - 6 /LPF    Methodology Manual Light Microscopy    ECG 12 Lead Chest Pain    Collection Time: 06/23/25 10:34 PM   Result Value Ref Range    QT Interval 354 ms    QTC Interval 413 ms       If labs were ordered, I independently reviewed the results and considered them in treating the patient.    RADIOLOGY  CT Abdomen Pelvis Without Contrast   Final Result   Impression:   1.No acute intra-abdominal or pelvic abnormality.   2.Colonic diverticulosis without acute diverticulitis.   3.Mild hepatic steatosis.            Electronically Signed: Kev Ray MD     6/23/2025 10:30 PM EDT     Workstation ID: [id number]      XR Chest 1 View   Final Result   Impression:   No active pulmonary process.         Electronically Signed: Miguel Fields MD     6/23/2025 8:33 PM EDT     Workstation ID: [id number]      US Renal Bilateral    (Results Pending)     [x] Radiologist's Report Reviewed:  I ordered and independently interpreted the above noted radiographic studies.  See radiologist's dictation for official interpretation.       PROCEDURES    Procedures    ECG 12 Lead Chest Pain   Preliminary Result   Test Reason : Chest Pain   Blood Pressure :   */*   mmHG   Vent. Rate :  82 BPM     Atrial Rate :  82 BPM      P-R Int : 158 ms          QRS Dur :  94 ms       QT Int : 354 ms       P-R-T Axes :   4   2  52 degrees     QTcB Int : 413 ms      Normal sinus rhythm   Nonspecific T wave abnormality   Abnormal ECG   When compared with ECG of 08-Feb-2025 05:43,   Nonspecific T wave abnormality no longer evident in Anterior leads      Referred By: edmd           Confirmed By:       Telemetry Scan   Final Result          MEDICATIONS GIVEN IN ER    Medications   sodium chloride 0.9 % flush 10 mL (has no administration in time range)   amLODIPine (NORVASC) tablet 10 mg (has no administration in time range)   aspirin EC tablet 81 mg (has no administration in time range)   atorvastatin (LIPITOR) tablet 80 mg (has no administration in time range)   buPROPion SR (WELLBUTRIN SR) 12 hr tablet 150 mg (has no administration in time range)   metoprolol succinate XL (TOPROL-XL) 24 hr tablet 50 mg (has no administration in time range)   sodium chloride 0.9 % flush 10 mL (has no administration in time range)   sodium chloride 0.9 % flush 10 mL (has no administration in time range)   sodium chloride 0.9 % infusion 40 mL (has no administration in time range)   dextrose (GLUTOSE) oral gel 15 g (has no administration in time range)   dextrose (D50W) (25 g/50 mL) IV injection 25 g (has no administration in time range)   glucagon (GLUCAGEN) injection 1 mg (has no administration in time range)   Insulin Lispro (humaLOG) injection 2-9 Units (has no administration in time range)   nitroglycerin (NITROSTAT) SL tablet 0.4 mg (has no administration in time range)   sodium chloride 0.9 % infusion (has no administration in time range)   sennosides-docusate (PERICOLACE) 8.6-50 MG per tablet 2 tablet (has no administration in time range)     And   polyethylene glycol (MIRALAX)  packet 17 g (has no administration in time range)     And   bisacodyl (DULCOLAX) EC tablet 5 mg (has no administration in time range)     And   bisacodyl (DULCOLAX) suppository 10 mg (has no administration in time range)   nicotine (NICODERM CQ) 21 MG/24HR patch 1 patch (has no administration in time range)   nicotine polacrilex (NICORETTE) gum 4 mg (has no administration in time range)   ondansetron (ZOFRAN) injection 4 mg (has no administration in time range)   morphine injection 2 mg (2 mg Intravenous Given 6/24/25 0245)   sodium chloride 0.9 % bolus 1,000 mL (0 mL Intravenous Stopped 6/23/25 2217)   ondansetron (ZOFRAN) injection 4 mg (4 mg Intravenous Given 6/23/25 2148)   HYDROmorphone (DILAUDID) injection 1 mg (1 mg Intravenous Given 6/23/25 2155)   sodium chloride 0.9 % bolus 1,000 mL (1,000 mL Intravenous New Bag 6/24/25 0104)       MEDICAL DECISION MAKING, PROGRESS, and CONSULTS   Medical Decision Making  50-year-old male presented to the Emergency Department for evaluation of left lower quadrant abdominal pain, nausea, and vomiting, ongoing for two days. On examination, the patient appeared nontoxic with no evidence of an acute abdomen, rebound, guarding, or peritoneal signs. Laboratory results revealed a significantly elevated serum creatinine of 2.7 and BUN of 23.3, compared to prior values of 0.93 and 8 in February 2025. EKG showed no acute ischemic changes, and chest imaging demonstrated no acute cardiopulmonary abnormalities. A CT scan of the abdomen and pelvis was unremarkable for acute intra-abdominal or pelvic pathology. The patient was treated with supportive care, including IV fluids, antiemetics, and pain medication, resulting in symptomatic improvement. Vital signs remained stable throughout the ED course. Admitted to medicine for further evaluation management of acute kidney injury.    Problems Addressed:  Acute kidney injury: complicated acute illness or injury  History of diabetes mellitus:  complicated acute illness or injury  History of hypertension: complicated acute illness or injury    Amount and/or Complexity of Data Reviewed  Labs: ordered. Decision-making details documented in ED Course.  Radiology: ordered and independent interpretation performed. Decision-making details documented in ED Course.  ECG/medicine tests: ordered and independent interpretation performed. Decision-making details documented in ED Course.    Risk  Prescription drug management.  Decision regarding hospitalization.        Discussion below represents my analysis of pertinent findings related to patient's condition, differential diagnosis, treatment plan and final disposition.    Assessment includes with Differential diagnosis including but is not limited to:   Assessment:    **Differential Diagnoses:**  Viral gastroenteritis, food poisoning, dehydration, medication side effect (pain medication or stool softener), and bowel obstruction     Additional sources  Discussed/ obtained information from independent historians:   [] Spouse  [] Parent  [] Family member  [] Friend  [] EMS   [] Other:  External (non-ED) record review:   [] Inpatient record:   [] Office record:   [] Outpatient record:   [] Prior Outpatient labs:   [] Prior Outpatient radiology:   [] Primary Care record:   [] Outside ED record:   [] Other:   Patient's care impacted by:   [x] Diabetes  [x] Hypertension  [] Hyperlipidemia  [] Hypothyroidism   [x] Coronary Artery Disease  [] Congestive Heart Failure   [] COPD   [] Cancer   [] Obesity  [] GERD   [x] Tobacco Abuse   [] Substance Abuse    [] Anxiety   [x] Depression   [] Other:   Care significantly affected by Social Determinants of Health (housing and economic circumstances, unemployment)    [] Yes     [x] No   If yes, Patient's care significantly limited by  Social Determinants of Health including:   [] Inadequate housing   [] Low income   [] Alcoholism and drug addiction in family   [] Problems related to  primary support group   [] Unemployment   [] Problems related to employment   [] Other Social Determinants of Health:     Orders placed during this visit:  Orders Placed This Encounter   Procedures    XR Chest 1 View    CT Abdomen Pelvis Without Contrast    US Renal Bilateral    [place] Draw    Comprehensive Metabolic Panel    High Sensitivity Troponin T    Magnesium    Urinalysis With Microscopic If Indicated (No Culture) - Urine, Clean Catch    CBC Auto Differential    Lactic Acid, Plasma    Urinalysis, Microscopic Only - Urine, Clean Catch    Comprehensive Metabolic Panel    Magnesium    Phosphorus    Sodium, Urine, Random - Urine, Clean Catch    Creatinine Urine Random (kidney function) GFR component - Urine, Clean Catch    Urea Nitrogen, Urine - Urine, Clean Catch    CBC Auto Differential    Undress & Gown    Vital Signs    Vital Signs    Intake & Output    Weigh Patient    Oral Care    Place Sequential Compression Device    Maintain Sequential Compression Device    Maintain IV Access    Telemetry - Place Orders & Notify Provider of Results When Patient Experiences Acute Chest Pain, Dysrhythmia or Respiratory Distress    May Be Off Telemetry for Tests    Continuous Pulse Oximetry    Strict Intake & Output    Notify Provider (With Default Parameters)    Code Status and Medical Interventions: CPR (Attempt to Resuscitate); Full Support    Inpatient Nephrology Consult    Oxygen Therapy- Nasal Cannula; Titrate 1-6 LPM Per SpO2; 90 - 95%    POC Glucose 4x Daily Before Meals & at Bedtime    ECG 12 Lead Chest Pain    Telemetry Scan    Insert Peripheral IV    Insert Peripheral IV    Initiate Observation Status    ED Bed Request    Fall Precautions    CBC & Differential    Green Top (Gel)    Lavender Top    Gold Top - SST    Gray Top    Light Blue Top    CBC & Differential     ED Course:    ED Course as of 06/24/25 0306   Mon Jun 23, 2025 1947 Vitals and Telemetry tracing was reviewed and directly interpreted by myself  demonstrating blood pressure 150/96, temperature 97.9 °F, heart rate of 81, respirations 18 breaths/min and oxygen saturation 98% on room air [JG]   1947 BP: 150/96 [JG]   1947 Temp: 97.9 °F (36.6 °C) [JG]   1947 Heart Rate: 81 [JG]   1947 Resp: 18 [JG]   1947 SpO2: 98 % [JG]   2135 Serum creatinine from February 8, 2025 reveals results of 0.93 with a BUN of 8 [JG]   2135 Creatinine(!): 2.70 [JG]   2135 BUN(!): 23.3 [JG]   2140 Labs studies were reviewed and directly interpreted by myself and demonstrated elevated serum creatinine, high-sensitivity troponin elevated and consistent with prior results.  Remainder of labs without acute findings. [JG]   2213 XR Chest 1 View  Imaging of chest personally interpreted by myself with official read provided by radiology demonstrated no acute cardiopulmonary process.   [JG]   2300 CT Abdomen Pelvis Without Contrast  CT abdomen/pelvis personally interpreted by myself and with official read provided by radiology demonstrates no acute intra-abdominal or pelvic abnormality [JG]   e Jun 24, 2025   0020 ECG 12 Lead Chest Pain  EKG personally interpreted by myself in addition to interpretation by attending without evidence of acute ischemic changes; normal sinus rhythm [JG]   0024 Hospital medicine consulted for admission, Dr. Rao [JG]   0057 Hospital medicine agreeable to accept patient for admisison [JG]   0057 Based on the patient's presentation, history and diffuse work-up in the emergency department, the patient is deemed appropriate for admission to the hospital for further evaluation and treatment.  This was discussed with the patient at bedside.  They are in agreement with the current medical management.    Admitting physician: Dr. Rao    Discussion was had with admitting physician regarding the laboratory and imaging findings.  We did discuss current therapeutics in the emergency department and progression of the patient.  Working diagnosis was conveyed to the  admitting physician, as well as current status and prognosis for the patient.  They are in agreement with these findings and have accepted admission.    Shared decision making:   After full review of the patient's clinical presentation, review of any work-up including but not limited to laboratory studies and radiology obtained, I had a discussion with the patient.  Treatment options were discussed as well as the risks, benefits and consequences.  I discussed all findings with the patient and family members if available.  During the discussion, treatment goals were understood by all as well as any misconceptions which were addressed with the patient.  Ample time was given for any questions they may have had.  They are in agreement with the treatment plan as well as final disposition  [JG]      ED Course User Index  [JG] Brett Acuna PA            DIAGNOSIS  Final diagnoses:   Acute kidney injury   History of diabetes mellitus   History of hypertension     DISPOSITION    ED Disposition       ED Disposition   Decision to Admit    Condition   --    Comment   Level of Care: Telemetry [5]   Diagnosis: Elevated serum creatinine [653089]   Admitting Physician: MIGUEL CANO III [[id number]]   Attending Physician: MIGUEL CANO III [[id number]]   Is patient appropriate for Inpatient Observation Unit?: Yes [1]   Bed Request Comments: tele obs                  Brett Acuna PA  06/24/25 1134

## 2025-06-24 NOTE — PROGRESS NOTES
[facility] Medicine Services  ADMISSION FOLLOW-UP NOTE          Patient admitted after midnight, H&P by my partner performed earlier on today's date reviewed.  Interim findings, labs, and charting also reviewed.        [facility] Hospital Problem List has been managed and updated to include any new diagnoses:  Active Hospital Problems    Diagnosis  POA    **Elevated serum creatinine [R79.89]  Yes    Abdominal pain [R10.9]  Unknown    HTN (hypertension) [I10]  Unknown    Cigarette smoker [F17.210]  Yes    Type 2 diabetes mellitus with diabetic neuropathy, without long-term current use of insulin [E11.40]  Yes      Resolved Hospital Problems   No resolved problems to display.       Aureliano Blake is a 50 y.o. AA male with PMHx of CAD-5 vessel CABG 3 years ago, depression/anxiety, IBS, DM2 who presents the ED tonight with 2 days of abdominal pain and cramping with nausea and vomiting.  Found to have elevated creatinine.  Admitted to the hospitalist for further eval and treatment. Recently underwent knee surgery and was given steroids/NSAIDS post operatively.     Elevated serum creatinine  - Creatinine 2.7  at admit, 3.04 today (up from 0.93 in feb this yr)  - Recent C pre ope clearance/ Post Op steroids & NSAIDS  - Recent emesis/diarrhea- possible prerenal  - Provide IVF  - Renal US is WNL  - Nephrology consulted     Abdominal pain  Nausea, loose BMs  - Last regular BM 1 week ago, took MiraLAX at home 6/23.  - CT demonstrates colonic diverticulosis with no acute diverticulitis, no acute abnormalities  - Continue Zofran, pain control PRN  - Clears and aat diet        Cigarette smoker  - 1 PPD, cessation education provided.  - Nicotine patch PRN       Type 2 diabetes mellitus with diabetic neuropathy, without long-term current use of insulin  - A1C 8.1 2/2025.  Repeat  - On metformin, jardiance, glimepiride, semaglutide, hold home meds  - SSI          HTN  - Currently stable  - On  metoprolol, norvasc, losartan, spironolactone at home  - Hold ARB, diuretic at this time.    Depression  - Patient reports he no longer takes Wellbutrin.  Takes Sertraline 25mg daily, will order.        ADDITIONAL PLAN:  - detailed assessment and plan from admission reviewed  - Advance diet  - Nephrology consulted, awaiting their recommendations/input.    Expected Discharge 6/25/2025  Expected Discharge Date: 6/24/2025; Expected Discharge Time:      Darling Butler, LETITIA  06/24/25

## 2025-06-24 NOTE — PROGRESS NOTES
Referring Provider: Primary team  Reason for Consultation: acute kidney injury    Subjective     Chief complaint LLQ pain.     History of present illness:  Mr Brantley is a yo gentleman with past medical hx of DM, HTN, CAD s/cabg. He is admitted with worsening LLQ pain/ nausea and diarrhea. Nephrology has been consulted for evaluation of abnormal renal function noted on this admission. Patient recently underwent left knee replacement. He was taking meloxicam for pain. Patient has been taking Lisinopril and ozempic for few yrs. cr baseline per records 0.9 mg 4 months ago. Cr 3.3 on recent testing. Patient denies any urinary symptoms.     History  Past Medical History:   Diagnosis Date   • Diabetes mellitus    • Heart disease    • Hypertension    ,   Past Surgical History:   Procedure Laterality Date   • CARDIAC SURGERY     • KNEE ARTHROSCOPY     ,   Family History   Problem Relation Age of Onset   • Diabetes Mother    • Heart disease Father    ,   Social History     Socioeconomic History   • Marital status:    Tobacco Use   • Smoking status: Some Days     Current packs/day: 1.00     Average packs/day: 1 pack/day for 31.1 years (31.1 ttl pk-yrs)     Types: Cigarettes     Start date: 06/1994   • Smokeless tobacco: Never   Vaping Use   • Vaping status: Never Used   Substance and Sexual Activity   • Alcohol use: Yes     Alcohol/week: 2.0 standard drinks of alcohol     Types: 2 Shots of liquor per week     Comment: occasional   • Drug use: No   • Sexual activity: Defer     E-cigarette/Vaping   • E-cigarette/Vaping Use Never User      E-cigarette/Vaping Substances     E-cigarette/Vaping Devices         , and   Medications Prior to Admission   Medication Sig Dispense Refill Last Dose/Taking   • amLODIPine (NORVASC) 10 MG tablet Take 1 tablet by mouth Daily.      • aspirin 81 MG EC tablet Take 1 tablet by mouth Daily.      • atorvastatin (LIPITOR) 80 MG tablet Take 1 tablet by mouth Daily.      • buPROPion SR  (WELLBUTRIN SR) 150 MG 12 hr tablet Take 1 tablet by mouth 2 (Two) Times a Day.      • Diclofenac Sodium (VOLTAREN) 1 % gel gel Apply 4 g topically to the appropriate area as directed 4 (Four) Times a Day As Needed (muscle and joint pain). 100 g 0    • empagliflozin (JARDIANCE) 25 MG tablet tablet Take 1 tablet by mouth Daily.      • ezetimibe (ZETIA) 10 MG tablet Take 1 tablet by mouth Daily.      • glimepiride (AMARYL) 4 MG tablet Take 1 tablet by mouth Daily.      • losartan (COZAAR) 100 MG tablet Take 1 tablet by mouth Every Night.      • metFORMIN ER (GLUCOPHAGE-XR) 500 MG 24 hr tablet Take 2 tablets by mouth 2 (Two) Times a Day Before Meals.      • metoprolol succinate XL (TOPROL-XL) 50 MG 24 hr tablet Take 1 tablet by mouth Daily.      • Semaglutide, 2 MG/DOSE, (OZEMPIC) 8 MG/3ML solution pen-injector Inject 2 mg under the skin into the appropriate area as directed 1 (One) Time Per Week. Patient takes on Fridays      • spironolactone (ALDACTONE) 25 MG tablet Take 1 tablet by mouth Daily. 30 tablet 0      Review of Systems  All systems were reviewed and negative except for:  Gastrointestinal: positive for  pain    Objective     Vital Signs  Temp:  [97.8 °F (36.6 °C)-98.6 °F (37 °C)] 97.8 °F (36.6 °C)  Heart Rate:  [72-89] 89  Resp:  [18] 18  BP: (116-164)/(76-97) 152/86      No intake/output data recorded.  I/O last 3 completed shifts:  In: 1000 [IV Piggyback:1000]  Out: -     Physical Exam:       General Appearance:  Alert, cooperative, in no acute distress   Head:  Normocephalic, without obvious abnormality, atraumatic   Eyes:  Lids and lashes normal, conjunctivae and sclerae normal, no icterus, no pallor, corneas clear, PERRLA   Ears:  Ears appear intact with no abnormalities noted   Throat:  No oral lesions, no thrush, oral mucosa moist   Neck:  No adenopathy, supple, trachea midline, no thyromegaly, no carotid bruit, no JVD   Back:  No kyphosis present, no scoliosis present, no skin lesions, erythema or  "scars, no tenderness to percussion or palpation, range of motion normal   Lungs:  Clear to auscultation, respirations regular, even and unlabored    Heart:  Regular rhythm and normal rate, normal S1 and S2, no murmur, no gallop, no rub, no click   Chest Wall:  No abnormalities observed   Abdomen:  Normal bowel sounds, no masses, no organomegaly, soft non-tender, non-distended, no guarding, no rebound tenderness   Rectal:  Deferred   Extremities:  Moves all extremities well, no edema, no cyanosis, no redness   Pulses:  Pulses palpable and equal bilaterally   Skin:  No bleeding, bruising or rash   Lymph nodes:  No palpable adenopathy   Neurologic:  Cranial nerves 2 - 12 grossly intact, sensation intact, DTR present and equal bilaterally                                                                               Results Review:   I reviewed the patient's new clinical results.    WBC WBC   Date Value Ref Range Status   06/24/2025 6.64 3.40 - 10.80 10*3/mm3 Final   06/23/2025 6.61 3.40 - 10.80 10*3/mm3 Final      HGB Hemoglobin   Date Value Ref Range Status   06/24/2025 10.0 (L) 13.0 - 17.7 g/dL Final   06/23/2025 11.2 (L) 13.0 - 17.7 g/dL Final      HCT Hematocrit   Date Value Ref Range Status   06/24/2025 31.6 (L) 37.5 - 51.0 % Final   06/23/2025 36.0 (L) 37.5 - 51.0 % Final      Platlets No results found for: \"LABPLAT\"   MCV MCV   Date Value Ref Range Status   06/24/2025 93.8 79.0 - 97.0 fL Final   06/23/2025 93.3 79.0 - 97.0 fL Final          Sodium Sodium   Date Value Ref Range Status   06/24/2025 139 136 - 145 mmol/L Final   06/23/2025 137 136 - 145 mmol/L Final      Potassium Potassium   Date Value Ref Range Status   06/24/2025 4.3 3.5 - 5.2 mmol/L Final   06/23/2025 4.6 3.5 - 5.2 mmol/L Final      Chloride Chloride   Date Value Ref Range Status   06/24/2025 106 98 - 107 mmol/L Final   06/23/2025 101 98 - 107 mmol/L Final      CO2 CO2   Date Value Ref Range Status   06/24/2025 23.0 22.0 - 29.0 mmol/L Final " "  06/23/2025 26.0 22.0 - 29.0 mmol/L Final      BUN BUN   Date Value Ref Range Status   06/24/2025 23.5 (H) 6.0 - 20.0 mg/dL Final   06/23/2025 23.3 (H) 6.0 - 20.0 mg/dL Final      Creatinine Creatinine   Date Value Ref Range Status   06/24/2025 3.04 (H) 0.76 - 1.27 mg/dL Final   06/23/2025 2.70 (H) 0.76 - 1.27 mg/dL Final      Calcium Calcium   Date Value Ref Range Status   06/24/2025 8.2 (L) 8.6 - 10.5 mg/dL Final   06/23/2025 8.9 8.6 - 10.5 mg/dL Final      PO4 No results found for: \"CAPO4\"   Albumin Albumin   Date Value Ref Range Status   06/24/2025 4.0 3.5 - 5.2 g/dL Final   06/23/2025 4.4 3.5 - 5.2 g/dL Final      Magnesium Magnesium   Date Value Ref Range Status   06/24/2025 1.7 1.6 - 2.6 mg/dL Final   06/23/2025 2.0 1.6 - 2.6 mg/dL Final      Uric Acid No results found for: \"URICACID\"         amLODIPine, 10 mg, Oral, Daily  aspirin, 81 mg, Oral, Daily  atorvastatin, 80 mg, Oral, Daily  insulin lispro, 2-9 Units, Subcutaneous, 4x Daily AC & at Bedtime  metoprolol succinate XL, 50 mg, Oral, Daily  nicotine, 1 patch, Transdermal, Q24H  sertraline, 25 mg, Oral, Daily  sodium chloride, 10 mL, Intravenous, Q12H    sodium chloride, 100 mL/hr, Last Rate: 100 mL/hr (06/24/25 1516)    Assessment & Plan       Elevated serum creatinine    Cigarette smoker    Type 2 diabetes mellitus with diabetic neuropathy, without long-term current use of insulin    Abdominal pain    HTN (hypertension)    Acute renal failure    Acute kidney Injury  LLQ pain  DM  Left knee surgery   HTN  CAD s/p CABG    Recs  Acute kidney injury likely due to hemodynamic injury in the setting NSAID use additional risk factor ACEI and metformin use.  Pendine urine Lytes  Renal US  Continue w IV fluids  Hold ACEI and metformi for now  Avoid NSAIDs   Strict I/O  No indication for dialysis     I discussed the patients findings and my recommendations with patient and family    Alex Munoz MD  06/24/25  @NOW      Time:       "

## 2025-06-24 NOTE — PLAN OF CARE
Goal Outcome Evaluation:         Patient admitted from the ED for elevated creatine. NS infusing, SCD's in place. Vital signs stable family at bedside.

## 2025-06-25 LAB
ALBUMIN SERPL-MCNC: 3.7 G/DL (ref 3.5–5.2)
ANION GAP SERPL CALCULATED.3IONS-SCNC: 9.2 MMOL/L (ref 5–15)
BUN SERPL-MCNC: 23.5 MG/DL (ref 6–20)
BUN/CREAT SERPL: 8.8 (ref 7–25)
CALCIUM SPEC-SCNC: 8.9 MG/DL (ref 8.6–10.5)
CHLORIDE SERPL-SCNC: 109 MMOL/L (ref 98–107)
CO2 SERPL-SCNC: 21.8 MMOL/L (ref 22–29)
CREAT SERPL-MCNC: 2.66 MG/DL (ref 0.76–1.27)
EGFRCR SERPLBLD CKD-EPI 2021: 28.2 ML/MIN/1.73
GLUCOSE BLDC GLUCOMTR-MCNC: 115 MG/DL (ref 70–130)
GLUCOSE BLDC GLUCOMTR-MCNC: 150 MG/DL (ref 70–130)
GLUCOSE BLDC GLUCOMTR-MCNC: 153 MG/DL (ref 70–130)
GLUCOSE BLDC GLUCOMTR-MCNC: 181 MG/DL (ref 70–130)
GLUCOSE SERPL-MCNC: 110 MG/DL (ref 65–99)
HBA1C MFR BLD: 5.43 % (ref 4.8–5.6)
PHOSPHATE SERPL-MCNC: 3 MG/DL (ref 2.5–4.5)
POTASSIUM SERPL-SCNC: 4.6 MMOL/L (ref 3.5–5.2)
SODIUM SERPL-SCNC: 140 MMOL/L (ref 136–145)

## 2025-06-25 PROCEDURE — 83036 HEMOGLOBIN GLYCOSYLATED A1C: CPT | Performed by: PHYSICIAN ASSISTANT

## 2025-06-25 PROCEDURE — 63710000001 INSULIN LISPRO (HUMAN) PER 5 UNITS: Performed by: NURSE PRACTITIONER

## 2025-06-25 PROCEDURE — 82948 REAGENT STRIP/BLOOD GLUCOSE: CPT

## 2025-06-25 PROCEDURE — 25010000002 MORPHINE PER 10 MG: Performed by: STUDENT IN AN ORGANIZED HEALTH CARE EDUCATION/TRAINING PROGRAM

## 2025-06-25 PROCEDURE — 99233 SBSQ HOSP IP/OBS HIGH 50: CPT | Performed by: PHYSICIAN ASSISTANT

## 2025-06-25 PROCEDURE — 25810000003 SODIUM CHLORIDE 0.9 % SOLUTION: Performed by: STUDENT IN AN ORGANIZED HEALTH CARE EDUCATION/TRAINING PROGRAM

## 2025-06-25 PROCEDURE — 80069 RENAL FUNCTION PANEL: CPT | Performed by: STUDENT IN AN ORGANIZED HEALTH CARE EDUCATION/TRAINING PROGRAM

## 2025-06-25 RX ORDER — HYDRALAZINE HYDROCHLORIDE 50 MG/1
50 TABLET, FILM COATED ORAL EVERY 8 HOURS SCHEDULED
Status: DISCONTINUED | OUTPATIENT
Start: 2025-06-25 | End: 2025-06-26

## 2025-06-25 RX ORDER — DICYCLOMINE HCL 20 MG
10 TABLET ORAL 3 TIMES DAILY
Status: COMPLETED | OUTPATIENT
Start: 2025-06-25 | End: 2025-06-26

## 2025-06-25 RX ORDER — HYDRALAZINE HYDROCHLORIDE 25 MG/1
25 TABLET, FILM COATED ORAL EVERY 12 HOURS SCHEDULED
Status: DISCONTINUED | OUTPATIENT
Start: 2025-06-25 | End: 2025-06-25

## 2025-06-25 RX ORDER — LABETALOL HYDROCHLORIDE 5 MG/ML
10 INJECTION, SOLUTION INTRAVENOUS EVERY 4 HOURS PRN
Status: DISCONTINUED | OUTPATIENT
Start: 2025-06-25 | End: 2025-06-26 | Stop reason: HOSPADM

## 2025-06-25 RX ORDER — SODIUM CHLORIDE 9 MG/ML
100 INJECTION, SOLUTION INTRAVENOUS CONTINUOUS
Status: DISCONTINUED | OUTPATIENT
Start: 2025-06-25 | End: 2025-06-25

## 2025-06-25 RX ORDER — HYDRALAZINE HYDROCHLORIDE 50 MG/1
50 TABLET, FILM COATED ORAL EVERY 12 HOURS SCHEDULED
Status: DISCONTINUED | OUTPATIENT
Start: 2025-06-25 | End: 2025-06-25

## 2025-06-25 RX ORDER — MORPHINE SULFATE 2 MG/ML
2 INJECTION, SOLUTION INTRAMUSCULAR; INTRAVENOUS EVERY 4 HOURS PRN
Status: DISPENSED | OUTPATIENT
Start: 2025-06-25 | End: 2025-06-26

## 2025-06-25 RX ADMIN — METOPROLOL SUCCINATE 50 MG: 50 TABLET, EXTENDED RELEASE ORAL at 08:00

## 2025-06-25 RX ADMIN — ASPIRIN 81 MG: 81 TABLET, COATED ORAL at 08:00

## 2025-06-25 RX ADMIN — INSULIN LISPRO 2 UNITS: 100 INJECTION, SOLUTION INTRAVENOUS; SUBCUTANEOUS at 11:41

## 2025-06-25 RX ADMIN — HYDRALAZINE HYDROCHLORIDE 50 MG: 50 TABLET ORAL at 20:44

## 2025-06-25 RX ADMIN — SODIUM CHLORIDE 100 ML/HR: 9 INJECTION, SOLUTION INTRAVENOUS at 08:36

## 2025-06-25 RX ADMIN — Medication 10 ML: at 20:51

## 2025-06-25 RX ADMIN — DICYCLOMINE HYDROCHLORIDE 10 MG: 20 TABLET ORAL at 09:56

## 2025-06-25 RX ADMIN — Medication 10 ML: at 08:01

## 2025-06-25 RX ADMIN — MORPHINE SULFATE 2 MG: 2 INJECTION, SOLUTION INTRAMUSCULAR; INTRAVENOUS at 20:43

## 2025-06-25 RX ADMIN — SERTRALINE 25 MG: 25 TABLET, FILM COATED ORAL at 08:00

## 2025-06-25 RX ADMIN — DICYCLOMINE HYDROCHLORIDE 10 MG: 20 TABLET ORAL at 17:00

## 2025-06-25 RX ADMIN — OXYCODONE 5 MG: 5 TABLET ORAL at 23:27

## 2025-06-25 RX ADMIN — Medication 10 MG: at 22:10

## 2025-06-25 RX ADMIN — HYDRALAZINE HYDROCHLORIDE 50 MG: 50 TABLET ORAL at 13:12

## 2025-06-25 RX ADMIN — MORPHINE SULFATE 2 MG: 2 INJECTION, SOLUTION INTRAMUSCULAR; INTRAVENOUS at 07:58

## 2025-06-25 RX ADMIN — DICYCLOMINE HYDROCHLORIDE 10 MG: 20 TABLET ORAL at 20:44

## 2025-06-25 RX ADMIN — DOCUSATE SODIUM 50 MG AND SENNOSIDES 8.6 MG 2 TABLET: 8.6; 5 TABLET, FILM COATED ORAL at 08:36

## 2025-06-25 RX ADMIN — ATORVASTATIN CALCIUM 80 MG: 40 TABLET, FILM COATED ORAL at 08:00

## 2025-06-25 RX ADMIN — HYDRALAZINE HYDROCHLORIDE 25 MG: 25 TABLET ORAL at 09:57

## 2025-06-25 RX ADMIN — INSULIN LISPRO 2 UNITS: 100 INJECTION, SOLUTION INTRAVENOUS; SUBCUTANEOUS at 20:43

## 2025-06-25 RX ADMIN — OXYCODONE 5 MG: 5 TABLET ORAL at 13:19

## 2025-06-25 RX ADMIN — OXYCODONE 5 MG: 5 TABLET ORAL at 04:31

## 2025-06-25 RX ADMIN — INSULIN LISPRO 2 UNITS: 100 INJECTION, SOLUTION INTRAVENOUS; SUBCUTANEOUS at 17:00

## 2025-06-25 RX ADMIN — POLYETHYLENE GLYCOL 3350 17 G: 17 POWDER, FOR SOLUTION ORAL at 08:36

## 2025-06-25 RX ADMIN — AMLODIPINE BESYLATE 10 MG: 5 TABLET ORAL at 08:01

## 2025-06-25 NOTE — PROGRESS NOTES
[facility] Medicine Services  PROGRESS NOTE    Patient Name: Aureliano Blake  : 1975  MRN: [id number]    Date of Admission: 2025  Primary Care Physician: Aliya Lee NP    Subjective   Subjective     CC:  Following for nausea/diarrhea, elevated Cr.    HPI:  No new overnight events.  BP elevated this a.m. - reports pain this a.m. Abdominal pain is cramping sensation lower abdominal area.  Took stool softener and Miralax this morning.  Also reports back pain from being in bed - chronic in nature.  Knee pain from recent surgery.    Reports he is getting antsy about being in hospital.        Objective   Objective     Vital Signs:   Temp:  [97.8 °F (36.6 °C)-98.2 °F (36.8 °C)] 97.8 °F (36.6 °C)  Heart Rate:  [67-78] 74  Resp:  [16-18] 16  BP: (152-192)/() 161/99     Physical Exam:  Constitutional: No acute distress, awake, alert  HENT: NCAT, mucous membranes moist  Respiratory: Clear to auscultation bilaterally, respiratory effort normal   Cardiovascular: RRR, no murmurs, rubs, or gallops  Gastrointestinal: Positive bowel sounds, soft, nontender, nondistended  Musculoskeletal: No bilateral ankle edema  Psychiatric: Appropriate affect, cooperative  Neurologic: Oriented x 3, strength symmetric in all extremities, Cranial Nerves grossly intact to confrontation, speech clear  Skin: No rashes    Results Reviewed:  LAB RESULTS:      Lab 25   WBC 6.64 6.61   HEMOGLOBIN 10.0* 11.2*   HEMATOCRIT 31.6* 36.0*   PLATELETS 227 282   NEUTROS ABS 4.40 4.58   IMMATURE GRANS (ABS) 0.01 0.01   LYMPHS ABS 1.23 1.24   MONOS ABS 0.76 0.61   EOS ABS 0.22 0.15   MCV 93.8 93.3   LACTATE  --  1.8   HSTROP T  --  52*         Lab 25  0406 25   SODIUM 140 139 137   POTASSIUM 4.6 4.3 4.6   CHLORIDE 109* 106 101   CO2 21.8* 23.0 26.0   ANION GAP 9.2 10.0 10.0   BUN 23.5* 23.5* 23.3*   CREATININE 2.66* 3.04* 2.70*   EGFR 28.2* 24.0* 27.7*    GLUCOSE 110* 96 127*   CALCIUM 8.9 8.2* 8.9   MAGNESIUM  --  1.7 2.0   PHOSPHORUS 3.0 4.6*  --          Lab 06/25/25  0406 06/24/25  0419 06/23/25 1946   TOTAL PROTEIN  --  7.1 7.3   ALBUMIN 3.7 4.0 4.4   GLOBULIN  --  3.1 2.9   ALT (SGPT)  --  17 23   AST (SGOT)  --  29 21   BILIRUBIN  --  0.6 0.7   ALK PHOS  --  82 94         Lab 06/23/25 1946   HSTROP T 52*                 Brief Urine Lab Results  (Last result in the past 365 days)        Color   Clarity   Blood   Leuk Est   Nitrite   Protein   CREAT   Urine HCG        06/24/25 0619             59.2                 Microbiology Results Abnormal       None            US Renal Bilateral  Result Date: 6/24/2025  US RENAL BILATERAL Date of Exam: 6/24/2025 5:12 AM EDT Indication: elevated creatinine. Comparison: No comparisons available. Technique: Grayscale and color Doppler ultrasound evaluation of the kidneys and urinary bladder was performed. Findings: The right kidney measures 14.2 cm and the left kidney measures 13.8 cm. Kidney echogenicity appear within normal limits. There is no solid kidney mass.  No echogenic shadowing stone.  No hydronephrosis. Bladder not fully distended shows no intraluminal echoes     Impression: Impression: Kidney ultrasound is within normal limits. Electronically Signed: Arnie Galvan MD  6/24/2025 7:29 AM EDT  Workstation ID: [id number]    CT Abdomen Pelvis Without Contrast  Result Date: 6/23/2025  CT ABDOMEN PELVIS WO CONTRAST Date of Exam: 6/23/2025 10:00 PM EDT Indication: LLQ abdominal pain. Comparison: CT angiogram of the abdomen and pelvis dated 2/8/2025 Technique: Axial CT images were obtained of the abdomen and pelvis without the administration of contrast. Reconstructed coronal and sagittal images were also obtained. Automated exposure control and iterative construction methods were used. Findings: The heart is mildly enlarged. There is no pericardial effusion. Median sternotomy wires are present. The lung bases are  clear. The liver is normal in size and contour. There is mild hepatic steatosis. There is no focal liver lesion by noncontrast technique. The gallbladder is present without wall thickening. There is no intrahepatic or extrahepatic biliary ductal dilatation. The spleen is normal in size. The adrenal glands and pancreas appear within normal limits. The kidneys are symmetric in size. There is no hydronephrosis or urolithiasis. The urinary bladder is collapsed which limits evaluation. The prostate is normal in size. The stomach and duodenum are normal in caliber and configuration. There are no abnormally dilated loops of small bowel to suggest small bowel obstruction. Stable gas-filled dilated appendix. There is no inflammatory stranding or wall thickening. There is  colonic diverticulosis without acute diverticulitis. There is no free fluid or free air. The aorta is normal in caliber without aneurysm formation. There is no abdominal or pelvic lymphadenopathy. There is degenerative disc disease of the lumbar spine with degenerative facet arthropathy.     Impression: Impression: 1.No acute intra-abdominal or pelvic abnormality. 2.Colonic diverticulosis without acute diverticulitis. 3.Mild hepatic steatosis. Electronically Signed: Kev Ray MD  6/23/2025 10:30 PM EDT  Workstation ID: [id number]    XR Chest 1 View  Result Date: 6/23/2025  XR CHEST 1 VW Date of Exam: 6/23/2025 8:20 PM EDT Indication: Weak/Dizzy/AMS triage protocol Comparison: CT chest 2/8/2025 Findings: Cardiomegaly. Prior median sternotomy and CABG. Negative for lobar consolidation, edema, effusion or pneumothorax. Osseous structures without acute abnormality.     Impression: Impression: No active pulmonary process. Electronically Signed: Miguel Fields MD  6/23/2025 8:33 PM EDT  Workstation ID: [id number]      Results for orders placed during the hospital encounter of 02/07/25    Adult Transthoracic Echo Complete With Contrast if Necessary Per  Protocol    Interpretation Summary    Left ventricular ejection fraction appears to be 51 - 55%.    The right ventricular cavity is mildly dilated.    Estimated right ventricular systolic pressure from tricuspid regurgitation is normal (<35 mmHg).    Normal left atrial size and volume noted.      Current medications:  Scheduled Meds:amLODIPine, 10 mg, Oral, Daily  aspirin, 81 mg, Oral, Daily  atorvastatin, 80 mg, Oral, Daily  dicyclomine, 10 mg, Oral, TID  hydrALAZINE, 50 mg, Oral, Q12H  insulin lispro, 2-9 Units, Subcutaneous, 4x Daily AC & at Bedtime  metoprolol succinate XL, 50 mg, Oral, Daily  nicotine, 1 patch, Transdermal, Q24H  sertraline, 25 mg, Oral, Daily  sodium chloride, 10 mL, Intravenous, Q12H      Continuous Infusions:sodium chloride, 100 mL/hr, Last Rate: 100 mL/hr (06/25/25 0836)      PRN Meds:.  acetaminophen    senna-docusate sodium **AND** polyethylene glycol **AND** bisacodyl **AND** bisacodyl    dextrose    dextrose    glucagon (human recombinant)    Morphine    nicotine polacrilex    nitroglycerin    ondansetron    oxyCODONE    sodium chloride    sodium chloride    sodium chloride    Assessment & Plan   Assessment & Plan     Active Hospital Problems    Diagnosis  POA    **Elevated serum creatinine [R79.89]  Yes    Abdominal pain [R10.9]  Unknown    HTN (hypertension) [I10]  Unknown    Acute renal failure [N17.9]  Yes    Cigarette smoker [F17.210]  Yes    Type 2 diabetes mellitus with diabetic neuropathy, without long-term current use of insulin [E11.40]  Yes      Resolved Hospital Problems   No resolved problems to display.        Brief Hospital Course to date:  Aureliano Blake is a 50 y.o.  AA male with PMHx of CAD-5 vessel CABG 3 years ago, depression/anxiety, IBS, DM2 who presents the ED tonight with 2 days of abdominal pain and cramping with nausea and vomiting.  Found to have elevated creatinine.  Admitted to the hospitalist for further eval and treatment. Recently underwent knee surgery  and was given steroids/NSAIDS post operatively. Nephrology following in consultation.     Elevated serum creatinine  - Creatinine 2.7 - 3.04 - 2.66 today. Trend daily labs.  - Recent LHC pre ope clearance/ Post Op steroids & NSAIDS  - Recent emesis/diarrhea- possible prerenal  - Continue IVF  - Renal US is WNL.  Urine studies WNL.  - Nephrology consulted/following. Appreciate their input.     Abdominal pain  Recent Nausea, loose BMs, now with mild constipcation  - Last regular BM 1 week ago  - CT demonstrates colonic diverticulosis with no acute diverticulitis, no acute abnormalities  - Continue Zofran, pain control PRN.  Trial short course Bentyl.  - Diet advanced per patient request 6/24   - Continue fluids.  Ambulate, mobilize.        Cigarette smoker  - 1 PPD, cessation education provided.  - Nicotine patch PRN         Type 2 diabetes mellitus with diabetic neuropathy, without long-term current use of insulin  - A1C 5.43 (down from 8.1 Feb '25)  - On metformin, jardiance, glimepiride, semaglutide; hold home meds  - SSI          HTN  - On metoprolol, norvasc, losartan, spironolactone at home  - Hold ARB, diuretic at this time.  - Will order Hydralazine while holding ARB/diuretics.  Continue to monitor.     Depression  - Patient reports he no longer takes Wellbutrin.    - Home dose Sertraline 25mg daily ordered.       Expected Discharge Location and Transportation: Home with family  Expected Discharge 6/25/2025  Expected Discharge Date: 6/24/2025; Expected Discharge Time:      VTE Prophylaxis:  Mechanical VTE prophylaxis orders are present.         AM-PAC 6 Clicks Score (PT): 24 (06/24/25 9672)    CODE STATUS:   Code Status and Medical Interventions: CPR (Attempt to Resuscitate); Full Support   Ordered at: 06/24/25 3236     Code Status (Patient has no pulse and is not breathing):    CPR (Attempt to Resuscitate)     Medical Interventions (Patient has pulse or is breathing):    Full Support     Level Of Support  Discussed With:    Patient       Darling ARBOLEDA Luke, LETITIA  06/25/25

## 2025-06-25 NOTE — PLAN OF CARE
Problem: Adult Inpatient Plan of Care  Goal: Plan of Care Review  Outcome: Progressing  Goal: Patient-Specific Goal (Individualized)  Outcome: Progressing  Goal: Absence of Hospital-Acquired Illness or Injury  Outcome: Progressing  Intervention: Identify and Manage Fall Risk  Recent Flowsheet Documentation  Taken 6/25/2025 1800 by Lexis Bella RN  Safety Promotion/Fall Prevention:   activity supervised   assistive device/personal items within reach   clutter free environment maintained   lighting adjusted   nonskid shoes/slippers when out of bed  Taken 6/25/2025 1600 by Lexis Bella RN  Safety Promotion/Fall Prevention:   activity supervised   assistive device/personal items within reach   clutter free environment maintained   lighting adjusted   nonskid shoes/slippers when out of bed  Taken 6/25/2025 1400 by Lexis Bella RN  Safety Promotion/Fall Prevention:   activity supervised   assistive device/personal items within reach   clutter free environment maintained   lighting adjusted   nonskid shoes/slippers when out of bed  Taken 6/25/2025 1200 by Lexis Bella RN  Safety Promotion/Fall Prevention:   activity supervised   assistive device/personal items within reach   clutter free environment maintained   lighting adjusted   nonskid shoes/slippers when out of bed  Taken 6/25/2025 1000 by Lexis Bella RN  Safety Promotion/Fall Prevention: safety round/check completed  Taken 6/25/2025 0800 by Lexis Bella RN  Safety Promotion/Fall Prevention: safety round/check completed  Intervention: Prevent Skin Injury  Recent Flowsheet Documentation  Taken 6/25/2025 1800 by Lexis Bella RN  Body Position: position changed independently  Skin Protection:   incontinence pads utilized   transparent dressing maintained  Taken 6/25/2025 1600 by Lexis Bella RN  Body Position: position changed independently  Skin Protection:   incontinence pads utilized   transparent dressing  maintained   skin sealant/moisture barrier applied  Taken 6/25/2025 1400 by Lexis Bella RN  Body Position: position changed independently  Skin Protection:   incontinence pads utilized   transparent dressing maintained  Taken 6/25/2025 1200 by Lexis Bella RN  Body Position: position changed independently  Skin Protection:   incontinence pads utilized   transparent dressing maintained  Taken 6/25/2025 1000 by Lexis Bella RN  Body Position: position changed independently  Skin Protection:   incontinence pads utilized   transparent dressing maintained  Taken 6/25/2025 0800 by Lexis Bella RN  Body Position: position changed independently  Skin Protection:   incontinence pads utilized   skin sealant/moisture barrier applied  Intervention: Prevent Infection  Recent Flowsheet Documentation  Taken 6/25/2025 1800 by Lexis Bella RN  Infection Prevention:   environmental surveillance performed   single patient room provided  Taken 6/25/2025 1600 by Lexis Bella RN  Infection Prevention:   environmental surveillance performed   single patient room provided  Taken 6/25/2025 1400 by Lexis Bella RN  Infection Prevention:   environmental surveillance performed   single patient room provided  Taken 6/25/2025 1200 by Lexis Bella RN  Infection Prevention:   environmental surveillance performed   single patient room provided  Taken 6/25/2025 1000 by Lexis Bella RN  Infection Prevention:   single patient room provided   environmental surveillance performed  Taken 6/25/2025 0800 by Lexis Bella RN  Infection Prevention:   single patient room provided   environmental surveillance performed  Goal: Optimal Comfort and Wellbeing  Outcome: Progressing  Goal: Readiness for Transition of Care  Outcome: Progressing

## 2025-06-25 NOTE — PROGRESS NOTES
"          Clinical Nutrition Assessment     Patient Name: Aureliano Blake  YOB: 1975  MRN: [id number]  Date of Encounter: 06/25/25 16:58 EDT  Admission date: 6/23/2025  Reason for Visit: Identified at risk by screening criteria, Reduced oral intake    Assessment   Nutrition Assessment   Admission Diagnosis:  Elevated serum creatinine [R79.89]  Acute renal failure [N17.9]    Problem List:    Elevated serum creatinine    Cigarette smoker    Type 2 diabetes mellitus with diabetic neuropathy, without long-term current use of insulin    Abdominal pain    HTN (hypertension)    Acute renal failure      PMH:   He  has a past medical history of Diabetes mellitus, Heart disease, and Hypertension.    PSH:  He  has a past surgical history that includes Knee Arthroscopy and Cardiac surgery.    Applicable Nutrition History:     Anthropometrics     Height: Height: 177.8 cm (70\")  Last Filed Weight: Weight: 98 kg (216 lb) (06/24/25 0139)  Method: Weight Method: Stated, Bed scale  BMI: BMI (Calculated): 31    UBW:  235 lbs per patient  Weight change: weight loss of 19 lbs (8.1%) over 5 month(s)    Significant?  No     Weight      Weight (kg) Weight (lbs) Weight Method   6/29/2024 107.956 kg  238 lb  Stated    1/2/2025 106.595 kg  235 lb     2/7/2025 106.595 kg  235 lb     2/9/2025 108.954 kg  240 lb 3.2 oz     6/23/2025 97.523 kg  215 lb     6/24/2025 97.977 kg  216 lb  Stated;Bed scale      Nutrition Focused Physical Exam    Date:  6/25       Pt does not meet criteria for malnutrition diagnosis, at this time.      Subjective   Reported/Observed/Food/Nutrition Related History:     Patient screened per nutrition protocol for reported decrease in appetite and/or PO intake. Presented for abdominal pain, N/V for the past 2 days. Reported decrease in appetite and poor PO intake. Patient reported weight loss, however, no significant weight loss noted in EMR. Declined any chewing or swallowing difficulties. Declined all ONS " options at this time. Pt notes he likes pasta and is tired of chicken. NKFA.    Current Nutrition Prescription   PO: Diet: Cardiac, Diabetic; Healthy Heart (2-3 Na+); Consistent Carbohydrate; Fluid Consistency: Thin (IDDSI 0)  Oral Nutrition Supplement: N/A  Intake: Insufficient data    Assessment & Plan   Nutrition Diagnosis   Date:  6/25            Updated:    Problem Predicted inadequate nutrient intake    Etiology N/V, abdominal pain   Signs/Symptoms Reported per patient   Status: New    Goal:   Nutrition to support treatment and Establish PO, Tolerate PO    Nutrition Intervention      Follow treatment progress, Care plan reviewed, Advise alternate selection, Advised available snacks, Interview for preferences, Encourage intake, Supplement offered/refused    Encourage PO intakes as able  Pt declines ONS at this time    Monitoring/Evaluation:   Per protocol, PO intake, Pertinent labs, GI status, Symptoms, POC/GOC    Radha Pierce RD  Time Spent: 35m

## 2025-06-25 NOTE — PLAN OF CARE
Problem: Adult Inpatient Plan of Care  Goal: Plan of Care Review  Outcome: Progressing  Goal: Patient-Specific Goal (Individualized)  Outcome: Progressing  Goal: Absence of Hospital-Acquired Illness or Injury  Outcome: Progressing  Intervention: Identify and Manage Fall Risk  Recent Flowsheet Documentation  Taken 6/25/2025 0431 by Paul Atkins RN  Safety Promotion/Fall Prevention:   toileting scheduled   safety round/check completed   room organization consistent   nonskid shoes/slippers when out of bed  Taken 6/25/2025 0200 by Paul Atkins RN  Safety Promotion/Fall Prevention:   safety round/check completed   room organization consistent   nonskid shoes/slippers when out of bed   fall prevention program maintained   clutter free environment maintained   assistive device/personal items within reach  Taken 6/24/2025 2230 by Paul Atkins RN  Safety Promotion/Fall Prevention:   activity supervised   assistive device/personal items within reach   clutter free environment maintained   fall prevention program maintained   lighting adjusted   nonskid shoes/slippers when out of bed   room organization consistent   safety round/check completed  Taken 6/24/2025 2000 by Paul Atkins RN  Safety Promotion/Fall Prevention:   safety round/check completed   room organization consistent   nonskid shoes/slippers when out of bed   lighting adjusted   clutter free environment maintained  Intervention: Prevent Skin Injury  Recent Flowsheet Documentation  Taken 6/25/2025 0431 by Paul Atkins RN  Body Position: position changed independently  Skin Protection: incontinence pads utilized  Taken 6/25/2025 0200 by Paul Atkins RN  Body Position: position changed independently  Skin Protection: incontinence pads utilized  Taken 6/24/2025 2230 by Paul Atkins RN  Body Position: position changed independently  Skin Protection:   incontinence pads utilized   protective footwear used   transparent dressing maintained  Taken  6/24/2025 2000 by Paul Atkins RN  Body Position:   position changed independently   weight shifting  Skin Protection: incontinence pads utilized  Intervention: Prevent and Manage VTE (Venous Thromboembolism) Risk  Recent Flowsheet Documentation  Taken 6/24/2025 2230 by Paul Atkins RN  VTE Prevention/Management: SCDs (sequential compression devices) off  Intervention: Prevent Infection  Recent Flowsheet Documentation  Taken 6/25/2025 0200 by Paul Atkins RN  Infection Prevention:   single patient room provided   rest/sleep promoted   hand hygiene promoted   equipment surfaces disinfected   cohorting utilized   environmental surveillance performed  Taken 6/24/2025 2230 by Paul Atkins RN  Infection Prevention:   cohorting utilized   environmental surveillance performed   equipment surfaces disinfected   hand hygiene promoted   rest/sleep promoted   single patient room provided  Taken 6/24/2025 2000 by Paul Atkins RN  Infection Prevention:   cohorting utilized   environmental surveillance performed   equipment surfaces disinfected   hand hygiene promoted   single patient room provided   rest/sleep promoted  Goal: Optimal Comfort and Wellbeing  Outcome: Progressing  Intervention: Monitor Pain and Promote Comfort  Recent Flowsheet Documentation  Taken 6/25/2025 0431 by Paul Atkins RN  Pain Management Interventions:   pain management plan reviewed with patient/caregiver   pain medication given  Taken 6/24/2025 2153 by Paul Atkins RN  Pain Management Interventions: pain medication given  Intervention: Provide Person-Centered Care  Recent Flowsheet Documentation  Taken 6/24/2025 2230 by Paul Atkins RN  Trust Relationship/Rapport:   thoughts/feelings acknowledged   reassurance provided   questions answered   empathic listening provided   emotional support provided  Taken 6/24/2025 2000 by Paul Atikns RN  Trust Relationship/Rapport:   care explained   emotional support provided   reassurance  provided   questions answered   questions encouraged   thoughts/feelings acknowledged  Goal: Readiness for Transition of Care  Outcome: Progressing     Problem: Pain Acute  Goal: Optimal Pain Control and Function  Outcome: Progressing  Intervention: Optimize Psychosocial Wellbeing  Recent Flowsheet Documentation  Taken 6/24/2025 2000 by Paul Atkins RN  Diversional Activities: television  Intervention: Develop Pain Management Plan  Recent Flowsheet Documentation  Taken 6/25/2025 0431 by Paul Atkins RN  Pain Management Interventions:   pain management plan reviewed with patient/caregiver   pain medication given  Taken 6/24/2025 2153 by Paul Atkins RN  Pain Management Interventions: pain medication given  Intervention: Prevent or Manage Pain  Recent Flowsheet Documentation  Taken 6/25/2025 0431 by Paul Atkins RN  Sleep/Rest Enhancement:   awakenings minimized   consistent schedule promoted   regular sleep/rest pattern promoted   relaxation techniques promoted   room darkened   noise level reduced  Medication Review/Management: medications reviewed  Taken 6/25/2025 0200 by Paul Atkins RN  Sleep/Rest Enhancement:   awakenings minimized   consistent schedule promoted   regular sleep/rest pattern promoted   relaxation techniques promoted   room darkened   noise level reduced  Medication Review/Management: medications reviewed  Taken 6/24/2025 2230 by Paul Atkins RN  Sleep/Rest Enhancement:   awakenings minimized   consistent schedule promoted   noise level reduced   regular sleep/rest pattern promoted   room darkened  Medication Review/Management: medications reviewed  Taken 6/24/2025 2000 by Paul Atkins RN  Medication Review/Management: medications reviewed   Goal Outcome Evaluation:

## 2025-06-26 ENCOUNTER — READMISSION MANAGEMENT (OUTPATIENT)
Dept: CALL CENTER | Facility: HOSPITAL | Age: 50
End: 2025-06-26
Payer: MEDICARE

## 2025-06-26 VITALS
HEART RATE: 69 BPM | RESPIRATION RATE: 16 BRPM | HEIGHT: 70 IN | DIASTOLIC BLOOD PRESSURE: 84 MMHG | OXYGEN SATURATION: 100 % | WEIGHT: 216 LBS | SYSTOLIC BLOOD PRESSURE: 143 MMHG | TEMPERATURE: 97.9 F | BODY MASS INDEX: 30.92 KG/M2

## 2025-06-26 LAB
ANION GAP SERPL CALCULATED.3IONS-SCNC: 9.1 MMOL/L (ref 5–15)
BUN SERPL-MCNC: 18 MG/DL (ref 6–20)
BUN/CREAT SERPL: 10.8 (ref 7–25)
CALCIUM SPEC-SCNC: 8.8 MG/DL (ref 8.6–10.5)
CHLORIDE SERPL-SCNC: 109 MMOL/L (ref 98–107)
CO2 SERPL-SCNC: 23.9 MMOL/L (ref 22–29)
CREAT SERPL-MCNC: 1.67 MG/DL (ref 0.76–1.27)
EGFRCR SERPLBLD CKD-EPI 2021: 49.2 ML/MIN/1.73
GLUCOSE BLDC GLUCOMTR-MCNC: 114 MG/DL (ref 70–130)
GLUCOSE SERPL-MCNC: 121 MG/DL (ref 65–99)
POTASSIUM SERPL-SCNC: 4.2 MMOL/L (ref 3.5–5.2)
SODIUM SERPL-SCNC: 142 MMOL/L (ref 136–145)

## 2025-06-26 PROCEDURE — 82948 REAGENT STRIP/BLOOD GLUCOSE: CPT

## 2025-06-26 PROCEDURE — 99239 HOSP IP/OBS DSCHRG MGMT >30: CPT | Performed by: PHYSICIAN ASSISTANT

## 2025-06-26 PROCEDURE — 25010000002 MORPHINE PER 10 MG: Performed by: STUDENT IN AN ORGANIZED HEALTH CARE EDUCATION/TRAINING PROGRAM

## 2025-06-26 PROCEDURE — 80048 BASIC METABOLIC PNL TOTAL CA: CPT | Performed by: PHYSICIAN ASSISTANT

## 2025-06-26 PROCEDURE — 25010000002 HYDRALAZINE PER 20 MG: Performed by: FAMILY MEDICINE

## 2025-06-26 RX ORDER — HYDRALAZINE HYDROCHLORIDE 100 MG/1
100 TABLET, FILM COATED ORAL EVERY 8 HOURS SCHEDULED
Qty: 90 TABLET | Refills: 1 | Status: SHIPPED | OUTPATIENT
Start: 2025-06-26

## 2025-06-26 RX ORDER — HYDRALAZINE HYDROCHLORIDE 50 MG/1
100 TABLET, FILM COATED ORAL EVERY 8 HOURS SCHEDULED
Status: DISCONTINUED | OUTPATIENT
Start: 2025-06-26 | End: 2025-06-26 | Stop reason: HOSPADM

## 2025-06-26 RX ORDER — SERTRALINE HYDROCHLORIDE 25 MG/1
25 TABLET, FILM COATED ORAL DAILY
Qty: 30 TABLET | Refills: 0 | Status: SHIPPED | OUTPATIENT
Start: 2025-06-26

## 2025-06-26 RX ORDER — HYDRALAZINE HYDROCHLORIDE 20 MG/ML
10 INJECTION INTRAMUSCULAR; INTRAVENOUS EVERY 6 HOURS PRN
Status: DISCONTINUED | OUTPATIENT
Start: 2025-06-26 | End: 2025-06-26 | Stop reason: HOSPADM

## 2025-06-26 RX ORDER — HYDRALAZINE HYDROCHLORIDE 50 MG/1
50 TABLET, FILM COATED ORAL ONCE
Status: COMPLETED | OUTPATIENT
Start: 2025-06-26 | End: 2025-06-26

## 2025-06-26 RX ORDER — ENALAPRILAT 1.25 MG/ML
1.25 INJECTION INTRAVENOUS EVERY 6 HOURS
Status: DISCONTINUED | OUTPATIENT
Start: 2025-06-26 | End: 2025-06-26

## 2025-06-26 RX ADMIN — OXYCODONE 5 MG: 5 TABLET ORAL at 06:12

## 2025-06-26 RX ADMIN — HYDRALAZINE HYDROCHLORIDE 10 MG: 20 INJECTION INTRAMUSCULAR; INTRAVENOUS at 10:17

## 2025-06-26 RX ADMIN — DICYCLOMINE HYDROCHLORIDE 10 MG: 20 TABLET ORAL at 09:04

## 2025-06-26 RX ADMIN — ASPIRIN 81 MG: 81 TABLET, COATED ORAL at 09:04

## 2025-06-26 RX ADMIN — MORPHINE SULFATE 2 MG: 2 INJECTION, SOLUTION INTRAMUSCULAR; INTRAVENOUS at 00:35

## 2025-06-26 RX ADMIN — ATORVASTATIN CALCIUM 80 MG: 40 TABLET, FILM COATED ORAL at 09:04

## 2025-06-26 RX ADMIN — SERTRALINE 25 MG: 25 TABLET, FILM COATED ORAL at 09:04

## 2025-06-26 RX ADMIN — Medication 10 ML: at 09:05

## 2025-06-26 RX ADMIN — HYDRALAZINE HYDROCHLORIDE 10 MG: 20 INJECTION INTRAMUSCULAR; INTRAVENOUS at 01:40

## 2025-06-26 RX ADMIN — HYDRALAZINE HYDROCHLORIDE 50 MG: 50 TABLET ORAL at 09:04

## 2025-06-26 RX ADMIN — ENALAPRILAT 1.25 MG: 1.25 INJECTION INTRAVENOUS at 00:34

## 2025-06-26 RX ADMIN — HYDRALAZINE HYDROCHLORIDE 50 MG: 50 TABLET ORAL at 06:33

## 2025-06-26 RX ADMIN — METOPROLOL SUCCINATE 50 MG: 50 TABLET, EXTENDED RELEASE ORAL at 09:04

## 2025-06-26 RX ADMIN — AMLODIPINE BESYLATE 10 MG: 5 TABLET ORAL at 09:04

## 2025-06-26 NOTE — CONSULTS
Patient Care Team:  Aliya Lee NP as PCP - General (Nurse Practitioner)    Chief complaint: LLQ pain.    Inpatient Nephrology Consult  Consult performed by: Alex Munoz MD  Consult ordered by: Bill Rao III, DO  Reason for consult: LUCY         Subjective     History of Present Illness: Mr Brantley is a yo gentleman with past medical hx of DM, HTN, CAD s/cabg. He is admitted with worsening LLQ pain/ nausea and diarrhea. Nephrology has been consulted for evaluation of abnormal renal function noted on this admission. Patient recently underwent left knee replacement. He was taking meloxicam for pain. Patient has been taking Lisinopril and ozempic for few yrs. cr baseline per records 0.9 mg 4 months ago. Cr 3.3 on recent testing. Patient denies any urinary symptoms.     Review of Systems   Constitutional: Negative.    HENT: Negative.     Respiratory: Negative.     Cardiovascular: Negative.    Gastrointestinal:  Positive for abdominal pain.   Musculoskeletal: Negative.    Hematological: Negative.    Psychiatric/Behavioral: Negative.          Past Medical History:   Diagnosis Date    Diabetes mellitus     Heart disease     Hypertension    ,   Past Surgical History:   Procedure Laterality Date    CARDIAC SURGERY      KNEE ARTHROSCOPY     ,   Family History   Problem Relation Age of Onset    Diabetes Mother     Heart disease Father    ,   Social History     Socioeconomic History    Marital status:    Tobacco Use    Smoking status: Some Days     Current packs/day: 1.00     Average packs/day: 1 pack/day for 31.1 years (31.1 ttl pk-yrs)     Types: Cigarettes     Start date: 06/1994    Smokeless tobacco: Never   Vaping Use    Vaping status: Never Used   Substance and Sexual Activity    Alcohol use: Yes     Alcohol/week: 2.0 standard drinks of alcohol     Types: 2 Shots of liquor per week     Comment: occasional    Drug use: No    Sexual activity: Defer     E-cigarette/Vaping    E-cigarette/Vaping  Use Never User      E-cigarette/Vaping Substances     E-cigarette/Vaping Devices         , and   Medications Prior to Admission   Medication Sig Dispense Refill Last Dose/Taking    amLODIPine (NORVASC) 10 MG tablet Take 1 tablet by mouth Daily.       aspirin 81 MG EC tablet Take 1 tablet by mouth Daily.       atorvastatin (LIPITOR) 80 MG tablet Take 1 tablet by mouth Daily.       buPROPion SR (WELLBUTRIN SR) 150 MG 12 hr tablet Take 1 tablet by mouth 2 (Two) Times a Day.       Diclofenac Sodium (VOLTAREN) 1 % gel gel Apply 4 g topically to the appropriate area as directed 4 (Four) Times a Day As Needed (muscle and joint pain). 100 g 0     empagliflozin (JARDIANCE) 25 MG tablet tablet Take 1 tablet by mouth Daily.       ezetimibe (ZETIA) 10 MG tablet Take 1 tablet by mouth Daily.       glimepiride (AMARYL) 4 MG tablet Take 1 tablet by mouth Daily.       losartan (COZAAR) 100 MG tablet Take 1 tablet by mouth Every Night.       metFORMIN ER (GLUCOPHAGE-XR) 500 MG 24 hr tablet Take 2 tablets by mouth 2 (Two) Times a Day Before Meals.       metoprolol succinate XL (TOPROL-XL) 50 MG 24 hr tablet Take 1 tablet by mouth Daily.       Semaglutide, 2 MG/DOSE, (OZEMPIC) 8 MG/3ML solution pen-injector Inject 2 mg under the skin into the appropriate area as directed 1 (One) Time Per Week. Patient takes on Fridays       spironolactone (ALDACTONE) 25 MG tablet Take 1 tablet by mouth Daily. 30 tablet 0        Objective     Vital Signs  Temp:  [97.8 °F (36.6 °C)-98.3 °F (36.8 °C)] 98 °F (36.7 °C)  Heart Rate:  [67-78] 77  Resp:  [16] 16  BP: (154-194)/() 194/100    No intake/output data recorded.  No intake/output data recorded.    Physical Exam  Constitutional:       Appearance: Normal appearance.   HENT:      Head: Normocephalic.      Nose: Nose normal.   Cardiovascular:      Rate and Rhythm: Normal rate and regular rhythm.      Pulses: Normal pulses.   Abdominal:      General: Abdomen is flat.   Musculoskeletal:          "General: Normal range of motion.      Cervical back: Normal range of motion. No rigidity.   Skin:     General: Skin is warm.   Neurological:      General: No focal deficit present.      Mental Status: He is alert and oriented to person, place, and time. Mental status is at baseline.         Results Review:    I reviewed the patient's new clinical results.    WBC WBC   Date Value Ref Range Status   06/24/2025 6.64 3.40 - 10.80 10*3/mm3 Final   06/23/2025 6.61 3.40 - 10.80 10*3/mm3 Final      HGB Hemoglobin   Date Value Ref Range Status   06/24/2025 10.0 (L) 13.0 - 17.7 g/dL Final   06/23/2025 11.2 (L) 13.0 - 17.7 g/dL Final      HCT Hematocrit   Date Value Ref Range Status   06/24/2025 31.6 (L) 37.5 - 51.0 % Final   06/23/2025 36.0 (L) 37.5 - 51.0 % Final      Platlets No results found for: \"LABPLAT\"   MCV MCV   Date Value Ref Range Status   06/24/2025 93.8 79.0 - 97.0 fL Final   06/23/2025 93.3 79.0 - 97.0 fL Final          Sodium Sodium   Date Value Ref Range Status   06/25/2025 140 136 - 145 mmol/L Final   06/24/2025 139 136 - 145 mmol/L Final   06/23/2025 137 136 - 145 mmol/L Final      Potassium Potassium   Date Value Ref Range Status   06/25/2025 4.6 3.5 - 5.2 mmol/L Final   06/24/2025 4.3 3.5 - 5.2 mmol/L Final   06/23/2025 4.6 3.5 - 5.2 mmol/L Final      Chloride Chloride   Date Value Ref Range Status   06/25/2025 109 (H) 98 - 107 mmol/L Final   06/24/2025 106 98 - 107 mmol/L Final   06/23/2025 101 98 - 107 mmol/L Final      CO2 CO2   Date Value Ref Range Status   06/25/2025 21.8 (L) 22.0 - 29.0 mmol/L Final   06/24/2025 23.0 22.0 - 29.0 mmol/L Final   06/23/2025 26.0 22.0 - 29.0 mmol/L Final      BUN BUN   Date Value Ref Range Status   06/25/2025 23.5 (H) 6.0 - 20.0 mg/dL Final   06/24/2025 23.5 (H) 6.0 - 20.0 mg/dL Final   06/23/2025 23.3 (H) 6.0 - 20.0 mg/dL Final      Creatinine Creatinine   Date Value Ref Range Status   06/25/2025 2.66 (H) 0.76 - 1.27 mg/dL Final   06/24/2025 3.04 (H) 0.76 - 1.27 mg/dL " "Final   06/23/2025 2.70 (H) 0.76 - 1.27 mg/dL Final      Calcium Calcium   Date Value Ref Range Status   06/25/2025 8.9 8.6 - 10.5 mg/dL Final   06/24/2025 8.2 (L) 8.6 - 10.5 mg/dL Final   06/23/2025 8.9 8.6 - 10.5 mg/dL Final      PO4 No results found for: \"CAPO4\"   Albumin Albumin   Date Value Ref Range Status   06/25/2025 3.7 3.5 - 5.2 g/dL Final   06/24/2025 4.0 3.5 - 5.2 g/dL Final   06/23/2025 4.4 3.5 - 5.2 g/dL Final      Magnesium Magnesium   Date Value Ref Range Status   06/24/2025 1.7 1.6 - 2.6 mg/dL Final   06/23/2025 2.0 1.6 - 2.6 mg/dL Final      Uric Acid No results found for: \"URICACID\"         Assessment & Plan       Elevated serum creatinine    Cigarette smoker    Type 2 diabetes mellitus with diabetic neuropathy, without long-term current use of insulin    Abdominal pain    HTN (hypertension)    Acute renal failure      Assessment & Plan      Elevated serum creatinine    Cigarette smoker    Type 2 diabetes mellitus with diabetic neuropathy, without long-term current use of insulin    Abdominal pain    HTN (hypertension)    Acute renal failure     Acute kidney Injury  LLQ pain  DM  Left knee surgery   HTN  CAD s/p CABG    Recs  Acute kidney injury likely due to hemodynamic injury in the setting NSAID use additional risk factor ACEI and metformin use.  Pendine urine Lytes  Renal US  Continue w IV fluids  Hold ACEI and metformi for now  Avoid NSAIDs   Strict I/O  No indication for dialysis      I discussed the patients findings and my recommendations with patient and family     Alex Munoz MD    I discussed the patients findings and my recommendations with patient    Alex Munoz MD  06/25/25  21:43 EDT            "

## 2025-06-26 NOTE — PLAN OF CARE
Goal Outcome Evaluation:   Patient admitted on 6/24/2025 for abdominal pain,cramping and nausea and vomiting. Pt was found to have an elevated creatinine which Nephrology was consulted to manage. Also, pt had high blood pressure which was managed and hydralazine was added to his medication regimen. He was also started on sertralinhe and scripts were sent to [facility] as requested. Discharge instructions were reviewed and pt verbalizes an understanding of our discussion including taking his BP and logging prior to his doctors appointments. He will f/u in [facility] with his PCP on July 8th, 2025 with labs and with Dr. Munoz on July 21,2025 at 1000hrs.

## 2025-06-26 NOTE — CASE MANAGEMENT/SOCIAL WORK
Continued Stay Note  [facility]     Patient Name: Aureliano Blake  MRN: [id number]  Today's Date: 6/26/2025    Admit Date: 6/23/2025        Discharge Plan       Row Name 06/26/25 1028       Plan    Final Discharge Disposition Code 01 - home or self-care                   Discharge Codes    No documentation.                 Expected Discharge Date and Time       Expected Discharge Date Expected Discharge Time    Jun 26, 2025               CORETTA Domingo

## 2025-06-26 NOTE — DISCHARGE SUMMARY
[facility] Medicine Services  DISCHARGE SUMMARY    Patient Name: Aureliano Blake  : 1975  MRN: [id number]    Date of Admission: 2025  9:38 PM  Date of Discharge:  2025  Primary Care Physician: Aliya Lee, NP    Consults       Date and Time Order Name Status Description    2025  2:59 AM Inpatient Nephrology Consult Completed             Hospital Course     Presenting Problem: Elevated Serum Creatinine, abdominal pain    Active Hospital Problems    Diagnosis  POA    **Elevated serum creatinine [R79.89]  Yes    Abdominal pain [R10.9]  Unknown    HTN (hypertension) [I10]  Unknown    Acute renal failure [N17.9]  Yes    Cigarette smoker [F17.210]  Yes    Type 2 diabetes mellitus with diabetic neuropathy, without long-term current use of insulin [E11.40]  Yes      Resolved Hospital Problems   No resolved problems to display.          Hospital Course:  Aureliano Blake is a 50 y.o. male with PMHx of CAD-5 vessel CABG 3 years ago, depression/anxiety, IBS, DM2 who presents the ED tonight with 2 days of abdominal pain and cramping with nausea and vomiting.  Found to have elevated creatinine.  Admitted to the hospitalist for further eval and treatment. Recently underwent knee surgery and was given steroids/NSAIDS post operatively. Nephrology following in consultation.      Elevated serum creatinine  - Creatinine 2.7 - 3.04 - 2.66 today. Cr 1.67 on day of DC  - Recent LHC pre ope clearance/ Post Op steroids & NSAIDS  - Recent emesis/diarrhea- possible prerenal  - Renal US is WNL.  Urine studies WNL.  - Nephrology consulted/following. Appreciate their input.  - Will continue to hold ARB, diuretic at DC.  Close follow up with PCP early next week with repeat BMP recommended.       Abdominal pain  Recent Nausea, loose BMs, now with mild constipcation  - Last regular BM 1 week ago  - CT demonstrates colonic diverticulosis with no acute diverticulitis, no acute abnormalities  -  Diet advanced per patient request 6/24   - Continue oral fluids.  Ambulate, mobilize.  - Close follow up with PCP. Avoid NSAID use.        Cigarette smoker  - 1 PPD, cessation education provided.          Type 2 diabetes mellitus with diabetic neuropathy, without long-term current use of insulin  - A1C 5.43 (down from 8.1 Feb '25)  - On metformin, jardiance, glimepiride, semaglutide at home.  Can resume glimepiride and semaglutide at DC.  Hold others until follow up with PCP.            HTN  - On metoprolol, norvasc, losartan, spironolactone at home  - Hold ARB, diuretic at this time until follow up with PCP  - Rx Hydralazine 100mg TID at time of DC with close follow up with PCP     Depression  - Patient reports he no longer takes Wellbutrin.    - Home dose Sertraline 25mg daily ordered.         Discharge Follow Up Recommendations for outpatient labs/diagnostics:   1.  Follow up with PCP, Aliya Lee NP, early next week with BMP recommended.  Keep BP log and take to appt.   2.  Follow up with Nephrology in 3 weeks.         Day of Discharge     HPI:   Sitting up on bedside, has been up walking this morning.  Very anxious about still being in hospital.  Has had continued back pain/knee pain both of which are not new.  Had loose BM yesterday, no BM yet today.    Review of Systems   Constitutional:  Negative for activity change.   Gastrointestinal:  Positive for constipation and diarrhea. Negative for nausea and vomiting.   Genitourinary:  Negative for decreased urine volume, difficulty urinating and flank pain.   Psychiatric/Behavioral:  The patient is nervous/anxious.    All other systems reviewed and are negative.        Vital Signs:   Temp:  [97.9 °F (36.6 °C)-98.5 °F (36.9 °C)] 97.9 °F (36.6 °C)  Heart Rate:  [65-77] 71  Resp:  [16] 16  BP: (154-217)/() 178/107      Physical Exam:  Constitutional: No acute distress, awake, alert  HENT: NCAT, mucous membranes moist  Respiratory: Clear to auscultation  bilaterally, respiratory effort normal   Cardiovascular: RRR, no murmurs, rubs, or gallops  Gastrointestinal: Positive bowel sounds, soft, nontender, nondistended  Musculoskeletal: No bilateral ankle edema  Psychiatric: Appropriate affect, cooperative  Neurologic: Oriented x 3, strength symmetric in all extremities, Cranial Nerves grossly intact to confrontation, speech clear  Skin: No rashes    Pertinent  and/or Most Recent Results     LAB RESULTS:      Lab 06/24/25 0419 06/23/25 1946   WBC 6.64 6.61   HEMOGLOBIN 10.0* 11.2*   HEMATOCRIT 31.6* 36.0*   PLATELETS 227 282   NEUTROS ABS 4.40 4.58   IMMATURE GRANS (ABS) 0.01 0.01   LYMPHS ABS 1.23 1.24   MONOS ABS 0.76 0.61   EOS ABS 0.22 0.15   MCV 93.8 93.3   LACTATE  --  1.8         Lab 06/26/25  0442 06/25/25  1400 06/25/25  0406 06/24/25 0419 06/23/25 1946   SODIUM 142  --  140 139 137   POTASSIUM 4.2  --  4.6 4.3 4.6   CHLORIDE 109*  --  109* 106 101   CO2 23.9  --  21.8* 23.0 26.0   ANION GAP 9.1  --  9.2 10.0 10.0   BUN 18.0  --  23.5* 23.5* 23.3*   CREATININE 1.67*  --  2.66* 3.04* 2.70*   EGFR 49.2*  --  28.2* 24.0* 27.7*   GLUCOSE 121*  --  110* 96 127*   CALCIUM 8.8  --  8.9 8.2* 8.9   MAGNESIUM  --   --   --  1.7 2.0   PHOSPHORUS  --   --  3.0 4.6*  --    HEMOGLOBIN A1C  --  5.43  --   --   --          Lab 06/25/25  0406 06/24/25 0419 06/23/25 1946   TOTAL PROTEIN  --  7.1 7.3   ALBUMIN 3.7 4.0 4.4   GLOBULIN  --  3.1 2.9   ALT (SGPT)  --  17 23   AST (SGOT)  --  29 21   BILIRUBIN  --  0.6 0.7   ALK PHOS  --  82 94         Lab 06/23/25 1946   HSTROP T 52*                 Brief Urine Lab Results  (Last result in the past 365 days)        Color   Clarity   Blood   Leuk Est   Nitrite   Protein   CREAT   Urine HCG        06/24/25 0619             59.2               Microbiology Results (last 10 days)       ** No results found for the last 240 hours. **            US Renal Bilateral  Result Date: 6/24/2025  US RENAL BILATERAL Date of Exam: 6/24/2025 5:12  AM EDT Indication: elevated creatinine. Comparison: No comparisons available. Technique: Grayscale and color Doppler ultrasound evaluation of the kidneys and urinary bladder was performed. Findings: The right kidney measures 14.2 cm and the left kidney measures 13.8 cm. Kidney echogenicity appear within normal limits. There is no solid kidney mass.  No echogenic shadowing stone.  No hydronephrosis. Bladder not fully distended shows no intraluminal echoes     Impression: Kidney ultrasound is within normal limits. Electronically Signed: Arnie Galvan MD  6/24/2025 7:29 AM EDT  Workstation ID: [id number]    CT Abdomen Pelvis Without Contrast  Result Date: 6/23/2025  CT ABDOMEN PELVIS WO CONTRAST Date of Exam: 6/23/2025 10:00 PM EDT Indication: LLQ abdominal pain. Comparison: CT angiogram of the abdomen and pelvis dated 2/8/2025 Technique: Axial CT images were obtained of the abdomen and pelvis without the administration of contrast. Reconstructed coronal and sagittal images were also obtained. Automated exposure control and iterative construction methods were used. Findings: The heart is mildly enlarged. There is no pericardial effusion. Median sternotomy wires are present. The lung bases are clear. The liver is normal in size and contour. There is mild hepatic steatosis. There is no focal liver lesion by noncontrast technique. The gallbladder is present without wall thickening. There is no intrahepatic or extrahepatic biliary ductal dilatation. The spleen is normal in size. The adrenal glands and pancreas appear within normal limits. The kidneys are symmetric in size. There is no hydronephrosis or urolithiasis. The urinary bladder is collapsed which limits evaluation. The prostate is normal in size. The stomach and duodenum are normal in caliber and configuration. There are no abnormally dilated loops of small bowel to suggest small bowel obstruction. Stable gas-filled dilated appendix. There is no inflammatory  stranding or wall thickening. There is  colonic diverticulosis without acute diverticulitis. There is no free fluid or free air. The aorta is normal in caliber without aneurysm formation. There is no abdominal or pelvic lymphadenopathy. There is degenerative disc disease of the lumbar spine with degenerative facet arthropathy.     Impression: 1.No acute intra-abdominal or pelvic abnormality. 2.Colonic diverticulosis without acute diverticulitis. 3.Mild hepatic steatosis. Electronically Signed: Kev Ray MD  6/23/2025 10:30 PM EDT  Workstation ID: [id number]    XR Chest 1 View  Result Date: 6/23/2025  XR CHEST 1 VW Date of Exam: 6/23/2025 8:20 PM EDT Indication: Weak/Dizzy/AMS triage protocol Comparison: CT chest 2/8/2025 Findings: Cardiomegaly. Prior median sternotomy and CABG. Negative for lobar consolidation, edema, effusion or pneumothorax. Osseous structures without acute abnormality.     Impression: No active pulmonary process. Electronically Signed: Miguel Fields MD  6/23/2025 8:33 PM EDT  Workstation ID: [id number]              Results for orders placed during the hospital encounter of 02/07/25    Adult Transthoracic Echo Complete With Contrast if Necessary Per Protocol    Interpretation Summary    Left ventricular ejection fraction appears to be 51 - 55%.    The right ventricular cavity is mildly dilated.    Estimated right ventricular systolic pressure from tricuspid regurgitation is normal (<35 mmHg).    Normal left atrial size and volume noted.      Plan for Follow-up of Pending Labs/Results: No pending results.  Recommend repeat BMP with PCP next week.    Discharge Details        Discharge Medications        PAUSE taking these medications        Instructions Start Date   empagliflozin 25 MG tablet tablet  Wait to take this until your doctor or other care provider tells you to start again.  Follow up with PCP  Commonly known as: JARDIANCE   25 mg, Daily      metFORMIN  MG 24 hr tablet  Wait to  take this until your doctor or other care provider tells you to start again.  Follow up with PCP  Commonly known as: GLUCOPHAGE-XR   1,000 mg, 2 Times Daily Before Meals      spironolactone 25 MG tablet  Wait to take this until your doctor or other care provider tells you to start again.  Commonly known as: ALDACTONE   25 mg, Oral, Daily             New Medications        Instructions Start Date   hydrALAZINE 100 MG tablet  Commonly known as: APRESOLINE   100 mg, Oral, Every 8 Hours Scheduled      PHARMACY MEDS TO BED CONSULT   Not Applicable, Daily      sertraline 25 MG tablet  Commonly known as: ZOLOFT   25 mg, Oral, Daily             Continue These Medications        Instructions Start Date   amLODIPine 10 MG tablet  Commonly known as: NORVASC   10 mg, Daily      aspirin 81 MG EC tablet   81 mg, Daily      atorvastatin 80 MG tablet  Commonly known as: LIPITOR   80 mg, Daily      buPROPion  MG 12 hr tablet  Commonly known as: WELLBUTRIN SR   150 mg, Oral, 2 Times Daily      Diclofenac Sodium 1 % gel gel  Commonly known as: VOLTAREN   4 g, Topical, 4 Times Daily PRN      ezetimibe 10 MG tablet  Commonly known as: ZETIA   Take 1 tablet by mouth Daily.      glimepiride 4 MG tablet  Commonly known as: AMARYL   4 mg, Daily      metoprolol succinate XL 50 MG 24 hr tablet  Commonly known as: TOPROL-XL   50 mg, Daily      Semaglutide (2 MG/DOSE) 8 MG/3ML solution pen-injector  Commonly known as: OZEMPIC   2 mg, Subcutaneous, Weekly, Patient takes on Fridays             Stop These Medications      losartan 100 MG tablet  Commonly known as: COZAAR              No Known Allergies      Discharge Disposition: Home in stable condition  Home or Self Care    Diet:  Diet Order   Procedures    Diet: Cardiac, Diabetic; Healthy Heart (2-3 Na+); Consistent Carbohydrate; Fluid Consistency: Thin (IDDSI 0)            Activity: As tolerated      Restrictions or Other Recommendations:  Continue hydration.  Avoid use of NSAIDs        CODE STATUS:    Code Status and Medical Interventions: CPR (Attempt to Resuscitate); Full Support   Ordered at: 06/24/25 0226     Code Status (Patient has no pulse and is not breathing):    CPR (Attempt to Resuscitate)     Medical Interventions (Patient has pulse or is breathing):    Full Support     Level Of Support Discussed With:    Patient       No future appointments.              Darling Butler PA-C  06/26/25      Time Spent on Discharge:  I spent  40  minutes on this discharge activity which included: face-to-face encounter with the patient, reviewing the data in the system, coordination of the care with the nursing staff as well as consultants, documentation, and entering orders.

## 2025-06-26 NOTE — PLAN OF CARE
Problem: Adult Inpatient Plan of Care  Goal: Plan of Care Review  Outcome: Progressing  Goal: Patient-Specific Goal (Individualized)  Outcome: Progressing  Goal: Absence of Hospital-Acquired Illness or Injury  Outcome: Progressing  Intervention: Identify and Manage Fall Risk  Recent Flowsheet Documentation  Taken 6/26/2025 0200 by Anne Marie Sheppard RN  Safety Promotion/Fall Prevention:   safety round/check completed   nonskid shoes/slippers when out of bed   fall prevention program maintained   assistive device/personal items within reach   activity supervised  Taken 6/26/2025 0000 by Anne Marie Sheppard RN  Safety Promotion/Fall Prevention:   fall prevention program maintained   assistive device/personal items within reach   safety round/check completed   nonskid shoes/slippers when out of bed  Taken 6/25/2025 2200 by Anne Marie Sheppard RN  Safety Promotion/Fall Prevention:   safety round/check completed   fall prevention program maintained   assistive device/personal items within reach   nonskid shoes/slippers when out of bed  Taken 6/25/2025 2000 by Anne Marie Sheppard RN  Safety Promotion/Fall Prevention:   safety round/check completed   nonskid shoes/slippers when out of bed   fall prevention program maintained   activity supervised  Intervention: Prevent Skin Injury  Recent Flowsheet Documentation  Taken 6/26/2025 0200 by Anne Marie Sheppard RN  Body Position: position changed independently  Skin Protection:   incontinence pads utilized   transparent dressing maintained  Taken 6/26/2025 0000 by Anne Marie Sheppard RN  Body Position: position changed independently  Skin Protection:   incontinence pads utilized   transparent dressing maintained  Taken 6/25/2025 2200 by Anne Marie Sheppard RN  Body Position: position changed independently  Skin Protection:   incontinence pads utilized   transparent dressing maintained  Taken 6/25/2025 2000 by Anne Marie Sheppard RN  Body Position: position changed independently  Skin Protection:   incontinence  pads utilized   transparent dressing maintained  Intervention: Prevent Infection  Recent Flowsheet Documentation  Taken 6/26/2025 0200 by Anne Marie Sheppard RN  Infection Prevention: environmental surveillance performed  Taken 6/26/2025 0000 by Anne Marie Sheppard RN  Infection Prevention:   environmental surveillance performed   rest/sleep promoted   single patient room provided  Taken 6/25/2025 2200 by Anne Marie Sheppard RN  Infection Prevention:   environmental surveillance performed   rest/sleep promoted   single patient room provided  Taken 6/25/2025 2000 by Anne Marie Sheppard RN  Infection Prevention:   environmental surveillance performed   rest/sleep promoted   single patient room provided  Goal: Optimal Comfort and Wellbeing  Outcome: Progressing  Intervention: Monitor Pain and Promote Comfort  Recent Flowsheet Documentation  Taken 6/26/2025 0200 by Anne Marie Sheppard RN  Pain Management Interventions:   pillow support provided   medication offered but refused  Taken 6/26/2025 0100 by Anne Marie Sheppard RN  Pain Management Interventions: pain medication given  Taken 6/26/2025 0000 by Anne Marie Sheppard RN  Pain Management Interventions: pain medication given  Taken 6/25/2025 2200 by Anne Marie Sheppard RN  Pain Management Interventions: pillow support provided  Taken 6/25/2025 2000 by Anne Marie Sheppard RN  Pain Management Interventions: pain medication given  Intervention: Provide Person-Centered Care  Recent Flowsheet Documentation  Taken 6/25/2025 2000 by Anne Marie Sheppard RN  Trust Relationship/Rapport:   choices provided   care explained  Goal: Readiness for Transition of Care  Outcome: Progressing     Problem: Pain Acute  Goal: Optimal Pain Control and Function  Outcome: Progressing  Intervention: Optimize Psychosocial Wellbeing  Recent Flowsheet Documentation  Taken 6/25/2025 2200 by Anne Marie Sheppard RN  Diversional Activities: television  Taken 6/25/2025 2000 by Anne Marie Sheppard RN  Supportive Measures: self-care encouraged  Diversional  Activities:   smartphone   television  Intervention: Develop Pain Management Plan  Recent Flowsheet Documentation  Taken 6/26/2025 0200 by Anne Marie Sheppard RN  Pain Management Interventions:   pillow support provided   medication offered but refused  Taken 6/26/2025 0100 by Anen Marie Sheppard RN  Pain Management Interventions: pain medication given  Taken 6/26/2025 0000 by Anne Marie Sheppard RN  Pain Management Interventions: pain medication given  Taken 6/25/2025 2200 by Anne Marie Sheppard RN  Pain Management Interventions: pillow support provided  Taken 6/25/2025 2000 by Anne Marie Sheppard RN  Pain Management Interventions: pain medication given  Intervention: Prevent or Manage Pain  Recent Flowsheet Documentation  Taken 6/26/2025 0000 by Anne Marie Sheppard RN  Medication Review/Management: medications reviewed  Taken 6/25/2025 2000 by Anne Marie Sheppard RN  Bowel Elimination Promotion: adequate fluid intake promoted  Sleep/Rest Enhancement: regular sleep/rest pattern promoted  Medication Review/Management: medications reviewed     Goal Outcome Evaluation:      Pt frequently c/o of lower back and knee pain during shift. Pain meds administered per MAR. Pt's SBP ranged from 190-210s throughout shift. Pt asymptomatic. PO hydralazine dose administered early. BP remained high. Provider notified, see orders. BP meds administered per MAR. BP rechecked frequently. Last /86 at 0248 after IV hydralazine administered. Pt complained of new onset headache, declined tylenol at this time. Call bell within reach, RA, plan of care ongoing.

## 2025-06-26 NOTE — PROGRESS NOTES
Referring Provider: Primary team  Reason for Consultation: acute kidney injury    Subjective     Chief complaint LLQ pain.     History of present illness:  Mr Brantley is a yo gentleman with past medical hx of DM, HTN, CAD s/cabg. He is admitted with worsening LLQ pain/ nausea and diarrhea. Nephrology has been consulted for evaluation of abnormal renal function noted on this admission. Patient recently underwent left knee replacement. He was taking meloxicam for pain. Patient has been taking Lisinopril and ozempic for few yrs. cr baseline per records 0.9 mg 4 months ago. Cr 3.3 on recent testing. Patient denies any urinary symptoms.     History  Past Medical History:   Diagnosis Date    Diabetes mellitus     Heart disease     Hypertension    ,   Past Surgical History:   Procedure Laterality Date    CARDIAC SURGERY      KNEE ARTHROSCOPY     ,   Family History   Problem Relation Age of Onset    Diabetes Mother     Heart disease Father    ,   Social History     Socioeconomic History    Marital status:    Tobacco Use    Smoking status: Some Days     Current packs/day: 1.00     Average packs/day: 1 pack/day for 31.1 years (31.1 ttl pk-yrs)     Types: Cigarettes     Start date: 06/1994    Smokeless tobacco: Never   Vaping Use    Vaping status: Never Used   Substance and Sexual Activity    Alcohol use: Yes     Alcohol/week: 2.0 standard drinks of alcohol     Types: 2 Shots of liquor per week     Comment: occasional    Drug use: No    Sexual activity: Defer     E-cigarette/Vaping    E-cigarette/Vaping Use Never User      E-cigarette/Vaping Substances     E-cigarette/Vaping Devices         , and   Medications Prior to Admission   Medication Sig Dispense Refill Last Dose/Taking    amLODIPine (NORVASC) 10 MG tablet Take 1 tablet by mouth Daily.       aspirin 81 MG EC tablet Take 1 tablet by mouth Daily.       atorvastatin (LIPITOR) 80 MG tablet Take 1 tablet by mouth Daily.       buPROPion SR (WELLBUTRIN SR) 150 MG 12  hr tablet Take 1 tablet by mouth 2 (Two) Times a Day.       Diclofenac Sodium (VOLTAREN) 1 % gel gel Apply 4 g topically to the appropriate area as directed 4 (Four) Times a Day As Needed (muscle and joint pain). 100 g 0     empagliflozin (JARDIANCE) 25 MG tablet tablet Take 1 tablet by mouth Daily.       ezetimibe (ZETIA) 10 MG tablet Take 1 tablet by mouth Daily.       glimepiride (AMARYL) 4 MG tablet Take 1 tablet by mouth Daily.       losartan (COZAAR) 100 MG tablet Take 1 tablet by mouth Every Night.       metFORMIN ER (GLUCOPHAGE-XR) 500 MG 24 hr tablet Take 2 tablets by mouth 2 (Two) Times a Day Before Meals.       metoprolol succinate XL (TOPROL-XL) 50 MG 24 hr tablet Take 1 tablet by mouth Daily.       Semaglutide, 2 MG/DOSE, (OZEMPIC) 8 MG/3ML solution pen-injector Inject 2 mg under the skin into the appropriate area as directed 1 (One) Time Per Week. Patient takes on Fridays       spironolactone (ALDACTONE) 25 MG tablet Take 1 tablet by mouth Daily. 30 tablet 0      Review of Systems  All systems were reviewed and negative except for:  Gastrointestinal: positive for  pain    Objective     Vital Signs  Temp:  [97.8 °F (36.6 °C)-98.3 °F (36.8 °C)] 98 °F (36.7 °C)  Heart Rate:  [67-78] 77  Resp:  [16] 16  BP: (154-194)/() 194/100      No intake/output data recorded.  No intake/output data recorded.    Physical Exam:       General Appearance:  Alert, cooperative, in no acute distress   Head:  Normocephalic, without obvious abnormality, atraumatic   Eyes:  Lids and lashes normal, conjunctivae and sclerae normal, no icterus, no pallor, corneas clear, PERRLA   Ears:  Ears appear intact with no abnormalities noted   Throat:  No oral lesions, no thrush, oral mucosa moist   Neck:  No adenopathy, supple, trachea midline, no thyromegaly, no carotid bruit, no JVD   Back:  No kyphosis present, no scoliosis present, no skin lesions, erythema or scars, no tenderness to percussion or palpation, range of motion  "normal   Lungs:  Clear to auscultation, respirations regular, even and unlabored    Heart:  Regular rhythm and normal rate, normal S1 and S2, no murmur, no gallop, no rub, no click   Chest Wall:  No abnormalities observed   Abdomen:  Normal bowel sounds, no masses, no organomegaly, soft non-tender, non-distended, no guarding, no rebound tenderness   Rectal:  Deferred   Extremities:  Moves all extremities well, no edema, no cyanosis, no redness   Pulses:  Pulses palpable and equal bilaterally   Skin:  No bleeding, bruising or rash   Lymph nodes:  No palpable adenopathy   Neurologic:  Cranial nerves 2 - 12 grossly intact, sensation intact, DTR present and equal bilaterally                                                                               Results Review:   I reviewed the patient's new clinical results.    WBC WBC   Date Value Ref Range Status   06/24/2025 6.64 3.40 - 10.80 10*3/mm3 Final   06/23/2025 6.61 3.40 - 10.80 10*3/mm3 Final      HGB Hemoglobin   Date Value Ref Range Status   06/24/2025 10.0 (L) 13.0 - 17.7 g/dL Final   06/23/2025 11.2 (L) 13.0 - 17.7 g/dL Final      HCT Hematocrit   Date Value Ref Range Status   06/24/2025 31.6 (L) 37.5 - 51.0 % Final   06/23/2025 36.0 (L) 37.5 - 51.0 % Final      Platlets No results found for: \"LABPLAT\"   MCV MCV   Date Value Ref Range Status   06/24/2025 93.8 79.0 - 97.0 fL Final   06/23/2025 93.3 79.0 - 97.0 fL Final          Sodium Sodium   Date Value Ref Range Status   06/25/2025 140 136 - 145 mmol/L Final   06/24/2025 139 136 - 145 mmol/L Final   06/23/2025 137 136 - 145 mmol/L Final      Potassium Potassium   Date Value Ref Range Status   06/25/2025 4.6 3.5 - 5.2 mmol/L Final   06/24/2025 4.3 3.5 - 5.2 mmol/L Final   06/23/2025 4.6 3.5 - 5.2 mmol/L Final      Chloride Chloride   Date Value Ref Range Status   06/25/2025 109 (H) 98 - 107 mmol/L Final   06/24/2025 106 98 - 107 mmol/L Final   06/23/2025 101 98 - 107 mmol/L Final      CO2 CO2   Date Value Ref " "Range Status   06/25/2025 21.8 (L) 22.0 - 29.0 mmol/L Final   06/24/2025 23.0 22.0 - 29.0 mmol/L Final   06/23/2025 26.0 22.0 - 29.0 mmol/L Final      BUN BUN   Date Value Ref Range Status   06/25/2025 23.5 (H) 6.0 - 20.0 mg/dL Final   06/24/2025 23.5 (H) 6.0 - 20.0 mg/dL Final   06/23/2025 23.3 (H) 6.0 - 20.0 mg/dL Final      Creatinine Creatinine   Date Value Ref Range Status   06/25/2025 2.66 (H) 0.76 - 1.27 mg/dL Final   06/24/2025 3.04 (H) 0.76 - 1.27 mg/dL Final   06/23/2025 2.70 (H) 0.76 - 1.27 mg/dL Final      Calcium Calcium   Date Value Ref Range Status   06/25/2025 8.9 8.6 - 10.5 mg/dL Final   06/24/2025 8.2 (L) 8.6 - 10.5 mg/dL Final   06/23/2025 8.9 8.6 - 10.5 mg/dL Final      PO4 No results found for: \"CAPO4\"   Albumin Albumin   Date Value Ref Range Status   06/25/2025 3.7 3.5 - 5.2 g/dL Final   06/24/2025 4.0 3.5 - 5.2 g/dL Final   06/23/2025 4.4 3.5 - 5.2 g/dL Final      Magnesium Magnesium   Date Value Ref Range Status   06/24/2025 1.7 1.6 - 2.6 mg/dL Final   06/23/2025 2.0 1.6 - 2.6 mg/dL Final      Uric Acid No results found for: \"URICACID\"         amLODIPine, 10 mg, Oral, Daily  aspirin, 81 mg, Oral, Daily  atorvastatin, 80 mg, Oral, Daily  dicyclomine, 10 mg, Oral, TID  hydrALAZINE, 50 mg, Oral, Q8H  insulin lispro, 2-9 Units, Subcutaneous, 4x Daily AC & at Bedtime  metoprolol succinate XL, 50 mg, Oral, Daily  nicotine, 1 patch, Transdermal, Q24H  sertraline, 25 mg, Oral, Daily  sodium chloride, 10 mL, Intravenous, Q12H           Assessment & Plan       Elevated serum creatinine    Cigarette smoker    Type 2 diabetes mellitus with diabetic neuropathy, without long-term current use of insulin    Abdominal pain    HTN (hypertension)    Acute renal failure      Acute kidney Injury  LLQ pain  DM  Left knee surgery   HTN  CAD s/p CABG    Recs  Acute kidney injury likely due to hemodynamic injury in the setting NSAID use additional risk factor ACEI and metformin use.  D/C IV fludis.   Encourage po " intake  Hold ACEI and metformi for now  Avoid NSAIDs   Strict I/O  No indication for dialysis     I discussed the patients findings and my recommendations with patient and family    Alex Munoz MD  06/25/25  @[facility]

## 2025-06-27 LAB
QT INTERVAL: 354 MS
QTC INTERVAL: 413 MS

## 2025-06-27 NOTE — OUTREACH NOTE
Prep Survey      Flowsheet Row Responses   [facility] facility patient discharged from? [place]   Is LACE score < 7 ? No   Eligibility Readm Mgmt   Discharge diagnosis Elevated serum creatnine   Does the patient have one of the following disease processes/diagnoses(primary or secondary)? Other   Does the patient have Home health ordered? No   Is there a DME ordered? No   Prep survey completed? Yes            MALISSA ARBOLEDA - Registered Nurse

## 2025-07-01 ENCOUNTER — READMISSION MANAGEMENT (OUTPATIENT)
Dept: CALL CENTER | Facility: HOSPITAL | Age: 50
End: 2025-07-01
Payer: MEDICARE

## 2025-07-01 NOTE — OUTREACH NOTE
Medical Week 1 Survey      Flowsheet Row Responses   [facility] patient discharged from? [place]   Does the patient have one of the following disease processes/diagnoses(primary or secondary)? Other   Week 1 attempt successful? No   Unsuccessful attempts Attempt 1  [UTR pt, mother not having info on pt, suggests calling pt later]            Setffany OBREGON - Registered Nurse

## 2025-07-10 ENCOUNTER — READMISSION MANAGEMENT (OUTPATIENT)
Dept: CALL CENTER | Facility: HOSPITAL | Age: 50
End: 2025-07-10
Payer: MEDICARE

## 2025-07-10 NOTE — OUTREACH NOTE
"Medical Week 3 Survey      Flowsheet Row Responses   [facility] patient discharged from? [place]   Does the patient have one of the following disease processes/diagnoses(primary or secondary)? Other   Week 3 attempt successful? Yes   Call start time 0816   Call end time 0818   Discharge diagnosis Elevated serum creatnine   Meds reviewed with patient/caregiver? Yes   Is the patient having any side effects they believe may be caused by any medication additions or changes? No   Does the patient have all medications ordered at discharge? Yes   Is the patient taking all medications as directed (includes completed medication regime)? Yes   Does the patient have a primary care provider?  Yes   Has the patient kept scheduled appointments due by today? Yes   Psychosocial issues? No   What is the patient's perception of their health status since discharge? Improving   Is the patient/caregiver able to teach back the hierarchy of who to call/visit for symptoms/problems? PCP, Specialist, Home health nurse, Urgent Care, ED, 911 Yes   Additional teach back comments Call was brief and states \"everything is ok\".  Had f/u a few days ago with PCP.   Week 3 Call Completed? Yes   Graduated/Revoked comments No questions or needs at this time.   Call end time 0818            Mildred PARKER - Licensed Nurse  "